# Patient Record
Sex: FEMALE | Race: WHITE | NOT HISPANIC OR LATINO | Employment: FULL TIME | URBAN - NONMETROPOLITAN AREA
[De-identification: names, ages, dates, MRNs, and addresses within clinical notes are randomized per-mention and may not be internally consistent; named-entity substitution may affect disease eponyms.]

---

## 2017-01-09 ENCOUNTER — CLINICAL SUPPORT (OUTPATIENT)
Dept: OTOLARYNGOLOGY | Facility: CLINIC | Age: 52
End: 2017-01-09

## 2017-01-09 DIAGNOSIS — J30.9 ALLERGIC RHINITIS, UNSPECIFIED ALLERGIC RHINITIS TRIGGER, UNSPECIFIED RHINITIS SEASONALITY: Primary | ICD-10-CM

## 2017-01-09 PROCEDURE — 95117 IMMUNOTHERAPY INJECTIONS: CPT | Performed by: OTOLARYNGOLOGY

## 2017-01-09 NOTE — PROGRESS NOTES
ALLERGY SHOT PROCEDURE NOTE     ALLERGY TECHNICIAN:   Raquel Mera RN     ALLERGY HISTORY OF PRESENT ILLNESS:   The patient is a 51 y.o.  who returns for immunotherapy injection.   The patient overall has had an improvement of symptoms since the last injection. The patient has had a 75% improvement of nasal congestion and sinus pressure . That has lasted 7 days. She has not had allergy symptoms recently.    INJECTION DETAILS:   The site of the injection was cleansed with an alcohol swab. Serum was injected into the site after pulling back on the plunger to prevent intravascular injection. The patient showed no signs of immediate reaction. After the injection and was instructed to wait in the allergy waiting area for 20 minutes. After waiting, the patient showed no signs of reaction and was allowed to leave..

## 2017-01-09 NOTE — MR AVS SNAPSHOT
Shannon Redradha   1/9/2017 11:00 AM   Clinical Support    Dept Phone:  [phone]   Encounter #:  [id number]    Provider:  ALLERGY NURSE [facility]   Department:  [facility] OTOLARYNGOLGY                Your Full Care Plan              Your Updated Medication List      Notice  As of 1/9/2017  1:22 PM    You have not been prescribed any medications.            You Were Diagnosed With        Codes Comments    Allergic rhinitis, unspecified allergic rhinitis trigger, unspecified rhinitis seasonality    -  Primary ICD-10-CM: J30.9  ICD-9-CM: 477.9       Medications to be Given to You by a Medical Professional     Due       Frequency    (none) Allergy Serum Injection  Once    (none) Allergy Serum Injection  Once      Instructions     None    Patient Instructions History      Upcoming Appointments     Visit Type Date Time Department    INJECTION 1/9/2017 11:00 AM [facility]      MyChart Signup     Our records indicate that you have declined [facility] signup. If you would like to sign up for [facility], please email [email or web address] or call [phone] to obtain an activation code.             Other Info from Your Visit           Allergies     Shellfish-derived Products        Reason for Visit     Immunotherapy           Problems and Diagnoses Noted     Nasal inflammation due to allergen    -  Primary      Medications Administered     Allergy Serum Injection                  Allergy Serum Injection

## 2017-02-24 ENCOUNTER — CLINICAL SUPPORT NO REQUIREMENTS (OUTPATIENT)
Dept: OTOLARYNGOLOGY | Facility: CLINIC | Age: 52
End: 2017-02-24

## 2017-02-24 DIAGNOSIS — J30.89 OTHER ALLERGIC RHINITIS: Primary | ICD-10-CM

## 2017-02-24 PROCEDURE — 95165 ANTIGEN THERAPY SERVICES: CPT | Performed by: OTOLARYNGOLOGY

## 2017-02-27 ENCOUNTER — CLINICAL SUPPORT (OUTPATIENT)
Dept: OTOLARYNGOLOGY | Facility: CLINIC | Age: 52
End: 2017-02-27

## 2017-02-27 DIAGNOSIS — J30.89 OTHER ALLERGIC RHINITIS: Primary | ICD-10-CM

## 2017-02-27 PROCEDURE — 95117 IMMUNOTHERAPY INJECTIONS: CPT | Performed by: OTOLARYNGOLOGY

## 2017-02-27 NOTE — PROGRESS NOTES
ALLERGY SHOT PROCEDURE NOTE     ALLERGY TECHNICIAN:   Raquel Mera RN     ALLERGY HISTORY OF PRESENT ILLNESS:   The patient is a 51 y.o., who returns for immunotherapy injection.   The patient overall has had an improvement of symptoms since the last injection. The patient has had a 80% improvement of sneezing and Dryness of throat . That has lasted 6 days. She has had a return of sneezing and mouth dryness,dryness of throat since the last injection.     INJECTION DETAILS:   The site of the injection was cleansed with an alcohol swab. Serum was injected into the site after pulling back on the plunger to prevent intravascular injection. The patient showed no signs of immediate reaction. After the injection and was instructed to wait in the allergy waiting area for 20 minutes. After waiting, the patient showed no signs of reaction and was allowed to leave.    Reaction: 3mm

## 2017-04-04 ENCOUNTER — CLINICAL SUPPORT NO REQUIREMENTS (OUTPATIENT)
Dept: OTOLARYNGOLOGY | Facility: CLINIC | Age: 52
End: 2017-04-04

## 2017-04-04 DIAGNOSIS — J30.89 OTHER ALLERGIC RHINITIS: Primary | ICD-10-CM

## 2017-04-04 PROCEDURE — 95165 ANTIGEN THERAPY SERVICES: CPT | Performed by: OTOLARYNGOLOGY

## 2017-04-11 RX ORDER — EPINEPHRINE 0.3 MG/.3ML
INJECTION SUBCUTANEOUS
Qty: 1 EACH | Refills: 0 | Status: SHIPPED | OUTPATIENT
Start: 2017-04-11 | End: 2018-03-22 | Stop reason: SDUPTHER

## 2017-04-24 ENCOUNTER — CLINICAL SUPPORT (OUTPATIENT)
Dept: OTOLARYNGOLOGY | Facility: CLINIC | Age: 52
End: 2017-04-24

## 2017-04-24 DIAGNOSIS — J30.89 ALLERGIC RHINITIS DUE TO OTHER ALLERGIC TRIGGER, UNSPECIFIED RHINITIS SEASONALITY: Primary | ICD-10-CM

## 2017-04-24 PROCEDURE — 95117 IMMUNOTHERAPY INJECTIONS: CPT | Performed by: OTOLARYNGOLOGY

## 2017-04-24 NOTE — PROGRESS NOTES
ALLERGY SHOT PROCEDURE NOTE     ALLERGY TECHNICIAN:   Raquel Mera RN    ALLERGY HISTORY OF PRESENT ILLNESS:   The patient is a 52 y.o., who returns for immunotherapy injection.   The patient overall has had an improvement of symptoms since the last injection. The patient has had a 90% improvement of nasal drainage, nasal congestion and itchy eyes . That has lasted 6 days. She has had a return of sneezing since the last injection.     INJECTION DETAILS:   The site of the injection was cleansed with an alcohol swab. Serum was injected into the site after pulling back on the plunger to prevent intravascular injection. The patient showed no signs of immediate reaction. After the injection and was instructed to wait in the allergy waiting area for 20 minutes. After waiting, the patient showed no signs of reaction and was allowed to leave.    Vial safety test  Reaction: 3mm  Side :     right arm    Maintenance dose    Reaction    4mm  Side:        Left arm

## 2017-06-09 ENCOUNTER — CLINICAL SUPPORT NO REQUIREMENTS (OUTPATIENT)
Dept: OTOLARYNGOLOGY | Facility: CLINIC | Age: 52
End: 2017-06-09

## 2017-06-09 DIAGNOSIS — J30.89 OTHER ALLERGIC RHINITIS: Primary | ICD-10-CM

## 2017-06-09 PROCEDURE — 95165 ANTIGEN THERAPY SERVICES: CPT | Performed by: OTOLARYNGOLOGY

## 2017-06-12 ENCOUNTER — OFFICE VISIT (OUTPATIENT)
Dept: OTOLARYNGOLOGY | Facility: CLINIC | Age: 52
End: 2017-06-12

## 2017-06-12 VITALS
SYSTOLIC BLOOD PRESSURE: 140 MMHG | HEART RATE: 82 BPM | DIASTOLIC BLOOD PRESSURE: 84 MMHG | WEIGHT: 293 LBS | TEMPERATURE: 98 F | HEIGHT: 71 IN | BODY MASS INDEX: 41.02 KG/M2

## 2017-06-12 DIAGNOSIS — J30.9 ALLERGIC RHINITIS, UNSPECIFIED ALLERGIC RHINITIS TRIGGER, UNSPECIFIED RHINITIS SEASONALITY: Primary | ICD-10-CM

## 2017-06-12 PROCEDURE — 99213 OFFICE O/P EST LOW 20 MIN: CPT | Performed by: OTOLARYNGOLOGY

## 2017-06-12 RX ORDER — LIRAGLUTIDE 6 MG/ML
INJECTION SUBCUTANEOUS
Refills: 2 | COMMUNITY
Start: 2017-05-04 | End: 2023-03-28

## 2017-06-12 RX ORDER — LANCETS
EACH MISCELLANEOUS
Refills: 6 | COMMUNITY
Start: 2017-05-30

## 2017-06-12 RX ORDER — PANTOPRAZOLE SODIUM 20 MG/1
TABLET, DELAYED RELEASE ORAL
Refills: 2 | COMMUNITY
Start: 2017-05-08 | End: 2020-06-10

## 2017-06-12 RX ORDER — BUPROPION HYDROCHLORIDE 300 MG/1
TABLET ORAL
Refills: 1 | COMMUNITY
Start: 2017-06-08 | End: 2020-12-21

## 2017-06-12 RX ORDER — BUSPIRONE HYDROCHLORIDE 30 MG/1
TABLET ORAL
Refills: 4 | COMMUNITY
Start: 2017-06-06

## 2017-06-12 RX ORDER — MONTELUKAST SODIUM 10 MG/1
TABLET ORAL
Refills: 3 | COMMUNITY
Start: 2017-05-04 | End: 2019-06-10 | Stop reason: SDUPTHER

## 2017-06-12 RX ORDER — CYANOCOBALAMIN 1000 UG/ML
INJECTION, SOLUTION INTRAMUSCULAR; SUBCUTANEOUS
Refills: 3 | COMMUNITY
Start: 2017-05-15

## 2017-06-12 RX ORDER — FLUTICASONE PROPIONATE 50 MCG
2 SPRAY, SUSPENSION (ML) NASAL DAILY
Qty: 1 BOTTLE | Refills: 6 | Status: SHIPPED | OUTPATIENT
Start: 2017-06-12 | End: 2021-06-14

## 2017-06-12 RX ORDER — THYROID 60 MG
TABLET ORAL
Refills: 0 | COMMUNITY
Start: 2017-03-22 | End: 2020-06-10

## 2017-06-12 RX ORDER — ATORVASTATIN CALCIUM 40 MG/1
TABLET, FILM COATED ORAL
Refills: 2 | COMMUNITY
Start: 2017-04-12 | End: 2019-05-13

## 2017-06-12 RX ORDER — MEDROXYPROGESTERONE ACETATE 2.5 MG/1
TABLET ORAL
Refills: 3 | COMMUNITY
Start: 2017-05-29 | End: 2020-06-10

## 2017-06-12 RX ORDER — ESTRADIOL 1 MG/1
TABLET ORAL
Refills: 2 | COMMUNITY
Start: 2017-05-20 | End: 2020-06-10

## 2017-06-12 RX ORDER — FENOFIBRATE 145 MG/1
TABLET, COATED ORAL
Refills: 2 | COMMUNITY
Start: 2017-05-19 | End: 2020-12-21

## 2017-06-12 RX ORDER — LOSARTAN POTASSIUM 50 MG/1
TABLET ORAL
Refills: 0 | COMMUNITY
Start: 2017-06-07

## 2017-06-12 NOTE — PROGRESS NOTES
Patient Care Team:  Richard Bar MD as PCP - General  Miki Arreguin MD as Consulting Physician (Otolaryngology)    Chief Complaint   Patient presents with   • Follow-up     Allergies       Subjective   History of Present Illness:  Shannon Vega is a  52 y.o.  female who presents for follow up with no acute complaints.     Review of Systems:  Review of Systems   Constitutional: Negative for chills and fever.   HENT:        As per HPI   Gastrointestinal: Negative for nausea and vomiting.       Past History:  Past Medical History:   Diagnosis Date   • Allergic rhinitis    • Arthritis    • Asthma    • Chronic rhinitis    • Chronic sinusitis    • Depression    • Deviated nasal septum    • Diabetes mellitus    • Eustachian tube dysfunction    • GERD (gastroesophageal reflux disease)    • Hypertension    • Hypertrophy of nasal turbinates    • Hypothyroidism      Past Surgical History:   Procedure Laterality Date   • CHOLECYSTECTOMY     • KNEE SURGERY       Family History   Problem Relation Age of Onset   • Heart disease Other      Social History   Substance Use Topics   • Smoking status: Never Smoker   • Smokeless tobacco: Never Used   • Alcohol use No       Current Outpatient Prescriptions:   •  ACCU-CHEK FASTCLIX LANCETS misc, USE TO TEST BLOOD SUGAR 2-3 TIMES A DAY DX: E11.9, Disp: , Rfl: 6  •  ARMOUR THYROID 60 MG tablet, TAKE 1 TABLET BY MOUTH ONCE DAILY *REPLACES LEVOTHYROXINE, Disp: , Rfl: 0  •  atorvastatin (LIPITOR) 40 MG tablet, TAKE 1 TABLET BY MOUTH AT BEDTIME, Disp: , Rfl: 2  •  buPROPion XL (WELLBUTRIN XL) 300 MG 24 hr tablet, TAKE 1 TABLET BY MOUTH DAILY, Disp: , Rfl: 1  •  busPIRone (BUSPAR) 30 MG tablet, TAKE 1 TABLET BY MOUTH TWICE A DAY, Disp: , Rfl: 4  •  cyanocobalamin 1000 MCG/ML injection, INJECT 1 ML EVERY 2 WEEKS, Disp: , Rfl: 3  •  EPINEPHrine (EPIPEN 2-SADIQ) 0.3 MG/0.3ML solution auto-injector injection, Use as directed for Anaphylaxis, Disp: 1 each, Rfl: 0  •  estradiol  (ESTRACE) 1 MG tablet, TAKE 1 TABLET BY MOUTH EVERY DAY, Disp: , Rfl: 2  •  fenofibrate (TRICOR) 145 MG tablet, TAKE 1 TABLET BY MOUTH ONCE A DAY. STOP TRILIPIX., Disp: , Rfl: 2  •  losartan (COZAAR) 50 MG tablet, TAKE 2 TABLETS BY MOUTH EVERY DAY TO REPLACE 100 MG, Disp: , Rfl: 0  •  medroxyPROGESTERone (PROVERA) 2.5 MG tablet, TAKE 2 TABLETS BY MOUTH DAILY FOR THE NEXT 2 WEEKS THEN DECREASE TO 1 TAB DAILY, Disp: , Rfl: 3  •  montelukast (SINGULAIR) 10 MG tablet, TAKE 1 TABLET BY MOUTH DAILY, Disp: , Rfl: 3  •  pantoprazole (PROTONIX) 20 MG EC tablet, TAKE 1 TABLET BY MOUTH EVERY DAY, Disp: , Rfl: 2  •  VICTOZA 18 MG/3ML solution pen-injector, INJECT 1.8 MG SUBCUTANEOUSLY ONCE DAILY, Disp: , Rfl: 2  Allergies:  Shellfish-derived products    Objective     Vital Signs:  Temp:  [98 °F (36.7 °C)] 98 °F (36.7 °C)  Heart Rate:  [82] 82  BP: (140)/(84) 140/84    Physical Exam:  CONSTITUTIONAL: well nourished, well-developed, alert, oriented, in no acute distress   COMMUNICATION AND VOICE: able to communicate normally for age, normal voice quality  HEAD: normocephalic, no lesions, atraumatic, no tenderness, no masses   FACE: appearance normal, no lesions, no tenderness, no deformities, facial motion symmetric  EYES: ocular motility normal, eyelids normal, orbits normal, no proptosis, conjunctiva normal , pupils equal, round   EARS:  Hearing: response to conversational voice normal bilaterally   External Ears: auricles without lesions  NOSE:  External Nose: structure normal, no tenderness on palpation, no nasal discharge, no lesions, no evidence of trauma, nostrils patent   Intranasal exam: nasal mucosa with mucosal congestion and erythema, nasal septum relatively midline   ORAL:  Lips: upper and lower lips without lesion   NECK: neck appearance normal  CHEST/RESPIRATORY: respiratory effort normal, normal chest excursion  CARDIOVASCULAR: extremities without cyanosis or edema   NEUROLOGIC/PSYCHIATRIC: oriented appropriately,  mood normal, affect appropriate, CN II-XII intact grossly      Assessment   1. Allergic rhinitis, unspecified allergic rhinitis trigger, unspecified rhinitis seasonality        Plan   Continue current management plan.  -------MEDICATIONS:-------  continue previous medication as prescribed    Return in about 6 months (around 12/12/2017).    Miki Arreguin MD  06/12/17  10:49 AM

## 2017-06-26 ENCOUNTER — CLINICAL SUPPORT (OUTPATIENT)
Dept: OTOLARYNGOLOGY | Facility: CLINIC | Age: 52
End: 2017-06-26

## 2017-06-26 DIAGNOSIS — J30.9 ALLERGIC RHINITIS, UNSPECIFIED ALLERGIC RHINITIS TRIGGER, UNSPECIFIED RHINITIS SEASONALITY: Primary | ICD-10-CM

## 2017-06-26 PROCEDURE — 95117 IMMUNOTHERAPY INJECTIONS: CPT | Performed by: OTOLARYNGOLOGY

## 2017-06-26 NOTE — PROGRESS NOTES
ALLERGY SHOT PROCEDURE NOTE     ALLERGY TECHNICIAN:   Raquel Mera RN    ALLERGY HISTORY OF PRESENT ILLNESS:   The patient is a 52 y.o., who returns for immunotherapy injection.   The patient overall has had an improvement of symptoms since the last injection. The patient has had a 90% improvement of nasal drainage, nasal congestion and sinus pressure . That has lasted 6 days. She has had a return of nasal drainage and sneezing since the last injection.     INJECTION DETAILS:   The site of the injection was cleansed with an alcohol swab. Serum was injected into the site after pulling back on the plunger to prevent intravascular injection. The patient showed no signs of immediate reaction. After the injection and was instructed to wait in the allergy waiting area for 20 minutes. After waiting, the patient showed no signs of reaction and was allowed to leave.    Reaction: 4mm  Side :     right arm

## 2017-08-25 ENCOUNTER — CLINICAL SUPPORT NO REQUIREMENTS (OUTPATIENT)
Dept: OTOLARYNGOLOGY | Facility: CLINIC | Age: 52
End: 2017-08-25

## 2017-08-25 DIAGNOSIS — J30.89 OTHER ALLERGIC RHINITIS: Primary | ICD-10-CM

## 2017-08-25 PROCEDURE — 95165 ANTIGEN THERAPY SERVICES: CPT | Performed by: OTOLARYNGOLOGY

## 2017-09-11 ENCOUNTER — CLINICAL SUPPORT (OUTPATIENT)
Dept: OTOLARYNGOLOGY | Facility: CLINIC | Age: 52
End: 2017-09-11

## 2017-09-11 DIAGNOSIS — J30.9 ALLERGIC RHINITIS, UNSPECIFIED ALLERGIC RHINITIS TRIGGER, UNSPECIFIED RHINITIS SEASONALITY: Primary | ICD-10-CM

## 2017-09-11 PROCEDURE — 95117 IMMUNOTHERAPY INJECTIONS: CPT | Performed by: OTOLARYNGOLOGY

## 2017-09-11 NOTE — PROGRESS NOTES
ALLERGY SHOT PROCEDURE NOTE     ALLERGY TECHNICIAN:   Raquel Mera RN    ALLERGY HISTORY OF PRESENT ILLNESS:   The patient is a 52 y.o., who returns for immunotherapy injection.   The patient overall has had an improvement of symptoms since the last injection. The patient has had a % improvement of nasal drainage, nasal congestion, sinus pressure and sneezing . That has lasted 6 days. She has had a return of nasal congestion, sinus pressure, sneezing and mouth dryness since the last injection.     INJECTION DETAILS:   The site of the injection was cleansed with an alcohol swab. Serum was injected into the site after pulling back on the plunger to prevent intravascular injection. The patient showed no signs of immediate reaction. After the injection and was instructed to wait in the allergy waiting area for 20 minutes. After waiting, the patient showed no signs of reaction and was allowed to leave.    Vial safety  Reaction: 3mm  Side :     right arm    maintenance dose  Reaction: 3mm  Side :     left arm

## 2017-10-23 ENCOUNTER — CLINICAL SUPPORT NO REQUIREMENTS (OUTPATIENT)
Dept: OTOLARYNGOLOGY | Facility: CLINIC | Age: 52
End: 2017-10-23

## 2017-10-23 DIAGNOSIS — J30.89 OTHER ALLERGIC RHINITIS: Primary | ICD-10-CM

## 2017-10-23 PROCEDURE — 95165 ANTIGEN THERAPY SERVICES: CPT | Performed by: OTOLARYNGOLOGY

## 2017-11-01 ENCOUNTER — TELEPHONE (OUTPATIENT)
Dept: OTOLARYNGOLOGY | Facility: CLINIC | Age: 52
End: 2017-11-01

## 2017-11-01 NOTE — TELEPHONE ENCOUNTER
Left message about allergy vial being available at the [facility] on November 13th or 27th.

## 2017-11-13 ENCOUNTER — CLINICAL SUPPORT (OUTPATIENT)
Dept: OTOLARYNGOLOGY | Facility: CLINIC | Age: 52
End: 2017-11-13

## 2017-11-13 DIAGNOSIS — J30.89 OTHER ALLERGIC RHINITIS: Primary | ICD-10-CM

## 2017-11-13 PROCEDURE — 95115 IMMUNOTHERAPY ONE INJECTION: CPT | Performed by: OTOLARYNGOLOGY

## 2017-11-13 NOTE — PROGRESS NOTES
ALLERGY SHOT PROCEDURE NOTE     ALLERGY TECHNICIAN:   Tyra Smith MA    ALLERGY HISTORY OF PRESENT ILLNESS:   The patient is a 52 y.o., who returns for immunotherapy injection.   The patient overall has had an improvement of symptoms since the last injection. The patient has had a % improvement of nasal drainage, nasal congestion, sinus pressure and sneezing . That has lasted 6 days. She has had a return of nasal congestion, sinus pressure, sneezing and mouth dryness since the last injection.     INJECTION DETAILS:   The site of the injection was cleansed with an alcohol swab. Serum was injected into the site after pulling back on the plunger to prevent intravascular injection. The patient showed no signs of immediate reaction. After the injection and was instructed to wait in the allergy waiting area for 20 minutes. After waiting, the patient showed no signs of reaction and was allowed to leave.    Vial safety  Reaction: 9mm  Side :     right arm    maintenance dose  Reaction: 3mm  Side :     left arm

## 2017-12-11 ENCOUNTER — OFFICE VISIT (OUTPATIENT)
Dept: OTOLARYNGOLOGY | Facility: CLINIC | Age: 52
End: 2017-12-11

## 2017-12-11 VITALS
TEMPERATURE: 97.6 F | WEIGHT: 293 LBS | DIASTOLIC BLOOD PRESSURE: 88 MMHG | SYSTOLIC BLOOD PRESSURE: 138 MMHG | HEIGHT: 71 IN | BODY MASS INDEX: 41.02 KG/M2

## 2017-12-11 DIAGNOSIS — J30.9 CHRONIC ALLERGIC RHINITIS, UNSPECIFIED SEASONALITY, UNSPECIFIED TRIGGER: Primary | ICD-10-CM

## 2017-12-11 PROCEDURE — 99213 OFFICE O/P EST LOW 20 MIN: CPT | Performed by: OTOLARYNGOLOGY

## 2017-12-11 RX ORDER — TESTOSTERONE GEL, 1% 10 MG/G
1 GEL TRANSDERMAL
COMMUNITY
End: 2020-06-10

## 2017-12-11 RX ORDER — MELOXICAM 15 MG/1
15 TABLET ORAL
COMMUNITY
End: 2020-06-10

## 2017-12-11 RX ORDER — FLUTICASONE PROPIONATE 50 MCG
2 SPRAY, SUSPENSION (ML) NASAL
COMMUNITY
End: 2020-12-21 | Stop reason: SDUPTHER

## 2017-12-11 RX ORDER — SOLIFENACIN SUCCINATE 5 MG/1
5 TABLET, FILM COATED ORAL
COMMUNITY

## 2017-12-11 RX ORDER — PSEUDOEPHEDRINE HCL 30 MG
TABLET ORAL
COMMUNITY
Start: 2017-10-23 | End: 2020-12-21

## 2017-12-11 RX ORDER — TRIAMCINOLONE ACETONIDE 5 MG/G
1 CREAM TOPICAL
COMMUNITY

## 2017-12-11 RX ORDER — MECLIZINE HYDROCHLORIDE 25 MG/1
TABLET ORAL
COMMUNITY
Start: 2017-09-05

## 2017-12-11 RX ORDER — FLUOXETINE 10 MG/1
CAPSULE ORAL
COMMUNITY
Start: 2017-09-06 | End: 2023-03-28

## 2017-12-11 RX ORDER — ALBUTEROL SULFATE 90 UG/1
2 AEROSOL, METERED RESPIRATORY (INHALATION)
COMMUNITY

## 2017-12-11 RX ORDER — METFORMIN HYDROCHLORIDE 500 MG/1
TABLET, EXTENDED RELEASE ORAL
COMMUNITY
Start: 2017-09-01 | End: 2023-03-28

## 2017-12-11 RX ORDER — ONDANSETRON HYDROCHLORIDE 8 MG/1
TABLET, FILM COATED ORAL
COMMUNITY
Start: 2017-09-01

## 2017-12-11 RX ORDER — GUAIFENESIN 600 MG/1
600 TABLET, EXTENDED RELEASE ORAL
COMMUNITY

## 2017-12-11 RX ORDER — CETIRIZINE HYDROCHLORIDE 10 MG/1
10 TABLET ORAL
COMMUNITY

## 2017-12-11 NOTE — PROGRESS NOTES
Patient Intake Note    Review of Systems  Review of Systems   Constitutional: Negative for chills, fatigue and fever.   HENT:        See HPI   Respiratory: Negative for cough, choking, shortness of breath and wheezing.    Cardiovascular: Negative.    Gastrointestinal: Negative for constipation, diarrhea, nausea and vomiting.   Allergic/Immunologic: Positive for environmental allergies. Negative for food allergies.   Neurological: Positive for headaches. Negative for dizziness and light-headedness.   Hematological: Does not bruise/bleed easily.   Psychiatric/Behavioral: Negative for sleep disturbance.         Gabbie Gan  12/11/2017  9:19 AM

## 2017-12-11 NOTE — PATIENT INSTRUCTIONS
MyPlate from [employer]  The general, healthful diet is based on the 2010 Dietary Guidelines for Americans. The amount of food you need to eat from each food group depends on your age, sex, and level of physical activity and can be individualized by a dietitian. Go to ChooseMyPlate.gov for more information.  WHAT DO I NEED TO KNOW ABOUT THE MYPLATE PLAN?  · Enjoy your food, but eat less.    · Avoid oversized portions.      ½ of your plate should include fruits and vegetables.    ¼ of your plate should be grains.    ¼ of your plate should be protein.  Grains  · Make at least half of your grains whole grains.  · For a 2,000 calorie daily food plan, eat 6 oz every day.  · 1 oz is about 1 slice bread, 1 cup cereal, or ½ cup cooked rice, cereal, or pasta.  Vegetables  · Make half your plate fruits and vegetables.  · For a 2,000 calorie daily food plan, eat 2½ cups every day.  · 1 cup is about 1 cup raw or cooked vegetables or vegetable juice or 2 cups raw leafy greens.  Fruits  · Make half your plate fruits and vegetables.  · For a 2,000 calorie daily food plan, eat 2 cups every day.  · 1 cup is about 1 cup fruit or 100% fruit juice or ½ cup dried fruit.  Protein  · For a 2,000 calorie daily food plan, eat 5½ oz every day.  · 1 oz is about 1 oz meat, poultry, or fish, ¼ cup cooked beans, 1 egg, 1 Tbsp peanut butter, or ½ oz nuts or seeds.  Dairy  · Switch to fat-free or low-fat (1%) milk.  · For a 2,000 calorie daily food plan, eat 3 cups every day.  · 1 cup is about 1 cup milk or yogurt or soy milk (soy beverage), 1½ oz natural cheese, or 2 oz processed cheese.  Fats, Oils, and Empty Calories  · Only small amounts of oils are recommended.  · Empty calories are calories from solid fats or added sugars.  · Compare sodium in foods like soup, bread, and frozen meals. Choose the foods with lower numbers.  · Drink water instead of sugary drinks.  WHAT FOODS CAN I EAT?  Grains  Whole grains such as whole wheat, quinoa, millet, and  bulgur. Bread, rolls, and pasta made from whole grains. Brown or wild rice. Hot or cold cereals made from whole grains and without added sugar.  Vegetables  All fresh vegetables, especially fresh red, dark green, or orange vegetables. Peas and beans. Low-sodium frozen or canned vegetables prepared without added salt. Low-sodium vegetable juices.  Fruits  All fresh, frozen, and dried fruits. Canned fruit packed in water or fruit juice without added sugar. Fruit juices without added sugar.  Meats and Other Protein Sources  Boiled, baked, or grilled lean meat trimmed of fat. Skinless poultry. Fresh seafood and shellfish. Canned seafood packed in water. Unsalted nuts and unsalted nut butters. Tofu. Dried beans and pea. Eggs.  Dairy  Low-fat or fat-free milk, yogurt, and cheeses.   Sweets and Desserts  Frozen desserts made from low-fat milk.  Fats and Oils  Olive, peanut, and canola oils and margarine. Salad dressing and mayonnaise made from these oils.  Other  Soups and casseroles made from allowed ingredients and without added fat or salt.  The items listed above may not be a complete list of recommended foods or beverages. Contact your dietitian for more options.   WHAT FOODS ARE NOT RECOMMENDED?  Grains  Sweetened, low-fiber cereals. Packaged baked goods. Snack crackers and chips. Cheese crackers, butter crackers, and biscuits. Frozen waffles, sweet breads, doughnuts, pastries, packaged baking mixes, pancakes, cakes, and cookies.  Vegetables  Regular canned or frozen vegetables or vegetables prepared with salt. Canned tomatoes. Canned tomato sauce. Fried vegetables. Vegetables in cream sauce or cheese sauce.  Fruits  Fruits packed in syrup or made with added sugar.   Meats and Other Protein Sources  Marbled or fatty meats such as ribs. Poultry with skin. Fried meats, poultry, eggs, or fish. Sausages, hot dogs, and deli meats such as pastrami, bologna, or salami.  Dairy  Whole milk, cream, cheeses made from whole  milk, sour cream. Ice cream or yogurt made from whole milk or with added sugar.  Beverages  For adults, no more than one alcoholic drink per day. Regular soft drinks or other sugary beverages. Juice drinks.  Sweets and Desserts  Sugary or fatty desserts, candy, and other sweets.  Fats and Oils  Solid shortening or partially hydrogenated oils. Solid margarine. Margarine that contains trans fats. Butter.  The items listed above may not be a complete list of foods and beverages to avoid. Contact your dietitian for more information.     This information is not intended to replace advice given to you by your health care provider. Make sure you discuss any questions you have with your health care provider.     Document Released: 01/06/2009 Document Revised: 01/08/2016 Document Reviewed: 11/26/2014  [employer] Interactive Patient Education ©2017 [employer]   Calorie Counting for Weight Loss  Calories are energy you get from the things you eat and drink. Your body uses this energy to keep you going throughout the day. The number of calories you eat affects your weight. When you eat more calories than your body needs, your body stores the extra calories as fat. When you eat fewer calories than your body needs, your body burns fat to get the energy it needs.  Calorie counting means keeping track of how many calories you eat and drink each day. If you make sure to eat fewer calories than your body needs, you should lose weight. In order for calorie counting to work, you will need to eat the number of calories that are right for you in a day to lose a healthy amount of weight per week. A healthy amount of weight to lose per week is usually 1-2 lb (0.5-0.9 kg). A dietitian can determine how many calories you need in a day and give you suggestions on how to reach your calorie goal.   WHAT IS MY MY PLAN?  My goal is to have __________ calories per day.   If I have this many calories per day, I should lose around __________ pounds  per week.  WHAT DO I NEED TO KNOW ABOUT CALORIE COUNTING?  In order to meet your daily calorie goal, you will need to:  · Find out how many calories are in each food you would like to eat. Try to do this before you eat.  · Decide how much of the food you can eat.  · Write down what you ate and how many calories it had. Doing this is called keeping a food log.  WHERE DO I FIND CALORIE INFORMATION?  The number of calories in a food can be found on a Nutrition Facts label. Note that all the information on a label is based on a specific serving of the food. If a food does not have a Nutrition Facts label, try to look up the calories online or ask your dietitian for help.  HOW DO I DECIDE HOW MUCH TO EAT?  To decide how much of the food you can eat, you will need to consider both the number of calories in one serving and the size of one serving. This information can be found on the Nutrition Facts label. If a food does not have a Nutrition Facts label, look up the information online or ask your dietitian for help.  Remember that calories are listed per serving. If you choose to have more than one serving of a food, you will have to multiply the calories per serving by the amount of servings you plan to eat. For example, the label on a package of bread might say that a serving size is 1 slice and that there are 90 calories in a serving. If you eat 1 slice, you will have eaten 90 calories. If you eat 2 slices, you will have eaten 180 calories.  HOW DO I KEEP A FOOD LOG?  After each meal, record the following information in your food log:  · What you ate.  · How much of it you ate.  · How many calories it had.  · Then, add up your calories.  Keep your food log near you, such as in a small notebook in your pocket. Another option is to use a mobile vernell or website. Some programs will calculate calories for you and show you how many calories you have left each time you add an item to the log.  WHAT ARE SOME CALORIE COUNTING  TIPS?  · Use your calories on foods and drinks that will fill you up and not leave you hungry. Some examples of this include foods like nuts and nut butters, vegetables, lean proteins, and high-fiber foods (more than 5 g fiber per serving).  · Eat nutritious foods and avoid empty calories. Empty calories are calories you get from foods or beverages that do not have many nutrients, such as candy and soda. It is better to have a nutritious high-calorie food (such as an avocado) than a food with few nutrients (such as a bag of chips).  · Know how many calories are in the foods you eat most often. This way, you do not have to look up how many calories they have each time you eat them.  · Look out for foods that may seem like low-calorie foods but are really high-calorie foods, such as baked goods, soda, and fat-free candy.  · Pay attention to calories in drinks. Drinks such as sodas, specialty coffee drinks, alcohol, and juices have a lot of calories yet do not fill you up. Choose low-calorie drinks like water and diet drinks.  · Focus your calorie counting efforts on higher calorie items. Logging the calories in a garden salad that contains only vegetables is less important than calculating the calories in a milk shake.  · Find a way of tracking calories that works for you. Get creative. Most people who are successful find ways to keep track of how much they eat in a day, even if they do not count every calorie.  WHAT ARE SOME PORTION CONTROL TIPS?  · Know how many calories are in a serving. This will help you know how many servings of a certain food you can have.  · Use a measuring cup to measure serving sizes. This is helpful when you start out. With time, you will be able to estimate serving sizes for some foods.  · Take some time to put servings of different foods on your favorite plates, bowls, and cups so you know what a serving looks like.  · Try not to eat straight from a bag or box. Doing this can lead to  "overeating. Put the amount you would like to eat in a cup or on a plate to make sure you are eating the right portion.  · Use smaller plates, glasses, and bowls to prevent overeating. This is a quick and easy way to practice portion control. If your plate is smaller, less food can fit on it.  · Try not to multitask while eating, such as watching TV or using your computer. If it is time to eat, sit down at a table and enjoy your food. Doing this will help you to start recognizing when you are full. It will also make you more aware of what and how much you are eating.  HOW CAN I CALORIE COUNT WHEN EATING OUT?  · Ask for smaller portion sizes or child-sized portions.  · Consider sharing an entree and sides instead of getting your own entree.  · If you get your own entree, eat only half. Ask for a box at the beginning of your meal and put the rest of your entree in it so you are not tempted to eat it.  · Look for the calories on the menu. If calories are listed, choose the lower calorie options.  · Choose dishes that include vegetables, fruits, whole grains, low-fat dairy products, and lean protein. Focusing on smart food choices from each of the 5 food groups can help you stay on track at restaurants.  · Choose items that are boiled, broiled, grilled, or steamed.  · Choose water, milk, unsweetened iced tea, or other drinks without added sugars. If you want an alcoholic beverage, choose a lower calorie option. For example, a regular nyasia can have up to 700 calories and a glass of wine has around 150.  · Stay away from items that are buttered, battered, fried, or served with cream sauce. Items labeled \"crispy\" are usually fried, unless stated otherwise.  · Ask for dressings, sauces, and syrups on the side. These are usually very high in calories, so do not eat much of them.  · Watch out for salads. Many people think salads are a healthy option, but this is often not the case. Many salads come with galindo, fried " chicken, lots of cheese, fried chips, and dressing. All of these items have a lot of calories. If you want a salad, choose a garden salad and ask for grilled meats or steak. Ask for the dressing on the side, or ask for olive oil and vinegar or lemon to use as dressing.  · Estimate how many servings of a food you are given. For example, a serving of cooked rice is ½ cup or about the size of half a tennis ball or one cupcake wrapper. Knowing serving sizes will help you be aware of how much food you are eating at restaurants. The list below tells you how big or small some common portion sizes are based on everyday objects.    1 oz--4 stacked dice.    3 oz--1 deck of cards.    1 tsp--1 dice.    1 Tbsp--½ a Ping-Pong ball.    2 Tbsp--1 Ping-Pong ball.    ½ cup--1 tennis ball or 1 cupcake wrapper.    1 cup--1 baseball.     This information is not intended to replace advice given to you by your health care provider. Make sure you discuss any questions you have with your health care provider.     Document Released: 12/18/2006 Document Revised: 01/08/2016 Document Reviewed: 10/23/2014  [employer] Interactive Patient Education ©2017 [employer]     Exercising to Lose Weight  Exercising can help you to lose weight. In order to lose weight through exercise, you need to do vigorous-intensity exercise. You can tell that you are exercising with vigorous intensity if you are breathing very hard and fast and cannot hold a conversation while exercising.  Moderate-intensity exercise helps to maintain your current weight. You can tell that you are exercising at a moderate level if you have a higher heart rate and faster breathing, but you are still able to hold a conversation.  HOW OFTEN SHOULD I EXERCISE?  Choose an activity that you enjoy and set realistic goals. Your health care provider can help you to make an activity plan that works for you. Exercise regularly as directed by your health care provider. This may include:  · Doing  resistance training twice each week, such as:    Push-ups.    Sit-ups.    Lifting weights.    Using resistance bands.  · Doing a given intensity of exercise for a given amount of time. Choose from these options:    150 minutes of moderate-intensity exercise every week.    75 minutes of vigorous-intensity exercise every week.    A mix of moderate-intensity and vigorous-intensity exercise every week.  Children, pregnant women, people who are out of shape, people who are overweight, and older adults may need to consult a health care provider for individual recommendations. If you have any sort of medical condition, be sure to consult your health care provider before starting a new exercise program.  WHAT ARE SOME ACTIVITIES THAT CAN HELP ME TO LOSE WEIGHT?   · Walking at a rate of at least 4.5 miles an hour.  · Jogging or running at a rate of 5 miles per hour.  · Biking at a rate of at least 10 miles per hour.  · Lap swimming.  · Roller-skating or in-line skating.  · Cross-country skiing.  · Vigorous competitive sports, such as football, basketball, and soccer.  · Jumping rope.  · Aerobic dancing.  HOW CAN I BE MORE ACTIVE IN MY DAY-TO-DAY ACTIVITIES?  · Use the stairs instead of the elevator.  · Take a walk during your lunch break.  · If you drive, park your car farther away from work or school.  · If you take public transportation, get off one stop early and walk the rest of the way.  · Make all of your phone calls while standing up and walking around.  · Get up, stretch, and walk around every 30 minutes throughout the day.  WHAT GUIDELINES SHOULD I FOLLOW WHILE EXERCISING?  · Do not exercise so much that you hurt yourself, feel dizzy, or get very short of breath.  · Consult your health care provider prior to starting a new exercise program.  · Wear comfortable clothes and shoes with good support.  · Drink plenty of water while you exercise to prevent dehydration or heat stroke. Body water is lost during exercise and  must be replaced.  · Work out until you breathe faster and your heart beats faster.     This information is not intended to replace advice given to you by your health care provider. Make sure you discuss any questions you have with your health care provider.     Document Released: 01/20/2012 Document Revised: 01/08/2016 Document Reviewed: 05/21/2015  [employer] Interactive Patient Education ©2017 [employer]

## 2017-12-11 NOTE — PROGRESS NOTES
Patient Care Team:  Richard Bar MD as PCP - General  Miki Arreguin MD as Consulting Physician (Otolaryngology)    Chief Complaint   Patient presents with   • Follow-up     allergy follow up       Subjective   HPI   The patient is a 52-year-old white female who presents for follow-up of allergic rhinitis.  She has been on immunotherapy since 2014. Overall she is doing well with immunotherapy. She still gets sinusitis occasionally but they are fewer on immunotherapy.    Review of Systems:  Reviewed per patient intake note    Past History:  Past Medical History:   Diagnosis Date   • Allergic rhinitis    • Arthritis    • Asthma    • Chronic rhinitis    • Chronic sinusitis    • Depression    • Deviated nasal septum    • Diabetes mellitus    • Eustachian tube dysfunction    • GERD (gastroesophageal reflux disease)    • Hypertension    • Hypertrophy of nasal turbinates    • Hypothyroidism    • Sleep apnea     c-pap dependant     Past Surgical History:   Procedure Laterality Date   • CHOLECYSTECTOMY     • KNEE SURGERY       Family History   Problem Relation Age of Onset   • Heart disease Other      Social History   Substance Use Topics   • Smoking status: Never Smoker   • Smokeless tobacco: Never Used   • Alcohol use No     Current Outpatient Prescriptions on File Prior to Visit   Medication Sig   • ACCU-CHEK FASTCLIX LANCETS misc USE TO TEST BLOOD SUGAR 2-3 TIMES A DAY DX: E11.9   • ARMOUR THYROID 60 MG tablet TAKE 1 TABLET BY MOUTH ONCE DAILY *REPLACES LEVOTHYROXINE   • atorvastatin (LIPITOR) 40 MG tablet TAKE 1 TABLET BY MOUTH AT BEDTIME   • buPROPion XL (WELLBUTRIN XL) 300 MG 24 hr tablet TAKE 1 TABLET BY MOUTH DAILY   • busPIRone (BUSPAR) 30 MG tablet TAKE 1 TABLET BY MOUTH TWICE A DAY   • EPINEPHrine (EPIPEN 2-SADIQ) 0.3 MG/0.3ML solution auto-injector injection Use as directed for Anaphylaxis   • estradiol (ESTRACE) 1 MG tablet TAKE 1 TABLET BY MOUTH EVERY DAY   • fenofibrate (TRICOR) 145 MG tablet  TAKE 1 TABLET BY MOUTH ONCE A DAY. STOP TRILIPIX.   • losartan (COZAAR) 50 MG tablet TAKE 2 TABLETS BY MOUTH EVERY DAY TO REPLACE 100 MG   • medroxyPROGESTERone (PROVERA) 2.5 MG tablet TAKE 2 TABLETS BY MOUTH DAILY FOR THE NEXT 2 WEEKS THEN DECREASE TO 1 TAB DAILY   • montelukast (SINGULAIR) 10 MG tablet TAKE 1 TABLET BY MOUTH DAILY   • pantoprazole (PROTONIX) 20 MG EC tablet TAKE 1 TABLET BY MOUTH EVERY DAY   • VICTOZA 18 MG/3ML solution pen-injector INJECT 1.8 MG SUBCUTANEOUSLY ONCE DAILY   • cyanocobalamin 1000 MCG/ML injection INJECT 1 ML EVERY 2 WEEKS     No current facility-administered medications on file prior to visit.      Allergies:  Shellfish-derived products    Objective      Vital Signs:   Temp:  [97.6 °F (36.4 °C)] 97.6 °F (36.4 °C)  BP: (138)/(88) 138/88    Physical Exam   CONSTITUTIONAL: well nourished, well-developed, alert, oriented, in no acute distress   COMMUNICATION AND VOICE: able to communicate normally, normal voice quality  HEAD: normocephalic, no lesions, atraumatic, no tenderness, no masses   FACE: appearance normal, no lesions, no tenderness, no deformities, facial motion symmetric  EYES: ocular motility normal, eyelids normal, orbits normal, no proptosis, conjunctiva normal , pupils equal, round  HEARING: response to conversational voice normal bilaterally   EXTERNAL EARS: auricles without lesions  EXTERNAL NOSE: structure normal, no tenderness on palpation, no nasal discharge, no lesions, no evidence of trauma, nostrils patent  INTRANASAL EXAM: nasal mucosa with mucosal congestion and erythema, nasal septum without overt anterior deviation  LIPS: structure normal, no tenderness on palpation, no lesions, no evidence of trauma  NECK: neck appearance normal  LYMPH NODES: no lymphadenopathy  CHEST/RESPIRATORY: respiratory effort normal  CARDIOVASCULAR: extremities without cyanosis or edema, no overt jugulovenous distension present  NEUROLOGIC/PSYCHIATRIC: oriented appropriately for age,  mood normal, affect appropriate, cranial nerves intact grossly unless specifically mentioned above         Assessment   1. Chronic allergic rhinitis, unspecified seasonality, unspecified trigger        Plan    Continue current management plan.    QUALITY MEASURES   Body Mass Index Screening and Follow-Up Plan   Body mass index is 41.49 kg/(m^2).  Diet and generalized activity/ exersize handouts given on AVS.    Tobacco Use: Screening and Cessation Intervention     Smoking status: Never Smoker                                                              Smokeless status: Never Used                            Return in about 1 year (around 12/11/2018).    Miki Arreguin MD  12/11/17  9:35 AM

## 2017-12-29 ENCOUNTER — CLINICAL SUPPORT NO REQUIREMENTS (OUTPATIENT)
Dept: OTOLARYNGOLOGY | Facility: CLINIC | Age: 52
End: 2017-12-29

## 2017-12-29 DIAGNOSIS — J30.89 OTHER ALLERGIC RHINITIS: Primary | ICD-10-CM

## 2017-12-29 PROCEDURE — 95165 ANTIGEN THERAPY SERVICES: CPT | Performed by: OTOLARYNGOLOGY

## 2018-01-08 ENCOUNTER — CLINICAL SUPPORT (OUTPATIENT)
Dept: OTOLARYNGOLOGY | Facility: CLINIC | Age: 53
End: 2018-01-08

## 2018-01-08 DIAGNOSIS — J30.9 CHRONIC ALLERGIC RHINITIS, UNSPECIFIED SEASONALITY, UNSPECIFIED TRIGGER: Primary | ICD-10-CM

## 2018-01-08 PROCEDURE — 95117 IMMUNOTHERAPY INJECTIONS: CPT | Performed by: OTOLARYNGOLOGY

## 2018-01-08 NOTE — PROGRESS NOTES
ALLERGY SHOT PROCEDURE NOTE     ALLERGY TECHNICIAN:   Raquel Mera RN    ALLERGY HISTORY OF PRESENT ILLNESS:   The patient is a 52 y.o., who returns for immunotherapy injection.   The patient overall has had an improvement of symptoms since the last injection. The patient has had a 40% improvement of nasal drainage and nasal congestion . That has lasted 6 days. She has had a return of nasal drainage, nasal congestion, sneezing, itchy nose and mouth dryness since the last injection.     INJECTION DETAILS:   The site of the injection was cleansed with an alcohol swab. Serum was injected into the site after pulling back on the plunger to prevent intravascular injection. The patient showed no signs of immediate reaction. After the injection and was instructed to wait in the allergy waiting area for 20 minutes. After waiting, the patient showed no signs of reaction and was allowed to leave.    Vial safety  Reaction 5mm  Right arm    Maintenance  Reaction: 4mm  Side :     left arm

## 2018-03-12 ENCOUNTER — CLINICAL SUPPORT NO REQUIREMENTS (OUTPATIENT)
Dept: OTOLARYNGOLOGY | Facility: CLINIC | Age: 53
End: 2018-03-12

## 2018-03-12 ENCOUNTER — TELEPHONE (OUTPATIENT)
Dept: OTOLARYNGOLOGY | Facility: CLINIC | Age: 53
End: 2018-03-12

## 2018-03-12 DIAGNOSIS — J30.89 OTHER ALLERGIC RHINITIS: Primary | ICD-10-CM

## 2018-03-12 PROCEDURE — 95165 ANTIGEN THERAPY SERVICES: CPT | Performed by: OTOLARYNGOLOGY

## 2018-03-12 NOTE — TELEPHONE ENCOUNTER
Left message about allergy serum refill vial being ready and would be available at the [facility] on 3-.

## 2018-03-22 ENCOUNTER — CLINICAL SUPPORT (OUTPATIENT)
Dept: OTOLARYNGOLOGY | Facility: CLINIC | Age: 53
End: 2018-03-22

## 2018-03-22 DIAGNOSIS — J30.9 CHRONIC ALLERGIC RHINITIS, UNSPECIFIED SEASONALITY, UNSPECIFIED TRIGGER: Primary | ICD-10-CM

## 2018-03-22 PROCEDURE — 95117 IMMUNOTHERAPY INJECTIONS: CPT | Performed by: OTOLARYNGOLOGY

## 2018-03-22 RX ORDER — EPINEPHRINE 0.3 MG/.3ML
INJECTION SUBCUTANEOUS
Qty: 1 EACH | Refills: 0 | Status: SHIPPED | OUTPATIENT
Start: 2018-03-22 | End: 2018-07-31 | Stop reason: SDUPTHER

## 2018-03-22 NOTE — PROGRESS NOTES
ALLERGY SHOT PROCEDURE NOTE      ALLERGY TECHNICIAN:   Raquel Mera RN     ALLERGY HISTORY OF PRESENT ILLNESS:   The patient is a 52 y.o., who returns for immunotherapy injection.   The patient overall has had an improvement of symptoms since the last injection. The patient has had a 40% improvement of nasal drainage and nasal congestion . That has lasted 6 days. She has had a return of nasal drainage, nasal congestion, sneezing, itchy nose and mouth dryness since the last injection.      INJECTION DETAILS:   The site of the injection was cleansed with an alcohol swab. Serum was injected into the site after pulling back on the plunger to prevent intravascular injection. The patient showed no signs of immediate reaction. After the injection and was instructed to wait in the allergy waiting area for 20 minutes. After waiting, the patient showed no signs of reaction and was allowed to leave.     Vial safety  Reaction 4mm  Left arm     Maintenance  Reaction: 8mm  Side :       Right arm

## 2018-05-11 ENCOUNTER — CLINICAL SUPPORT NO REQUIREMENTS (OUTPATIENT)
Dept: OTOLARYNGOLOGY | Facility: CLINIC | Age: 53
End: 2018-05-11

## 2018-05-11 DIAGNOSIS — J30.89 OTHER ALLERGIC RHINITIS: Primary | ICD-10-CM

## 2018-05-11 PROCEDURE — 95165 ANTIGEN THERAPY SERVICES: CPT | Performed by: OTOLARYNGOLOGY

## 2018-06-05 ENCOUNTER — CLINICAL SUPPORT (OUTPATIENT)
Dept: OTOLARYNGOLOGY | Facility: CLINIC | Age: 53
End: 2018-06-05

## 2018-06-05 DIAGNOSIS — J30.89 OTHER ALLERGIC RHINITIS: Primary | ICD-10-CM

## 2018-06-05 PROCEDURE — 95117 IMMUNOTHERAPY INJECTIONS: CPT | Performed by: PHYSICIAN ASSISTANT

## 2018-06-05 NOTE — PROGRESS NOTES
ALLERGY SHOT PROCEDURE NOTE      ALLERGY TECHNICIAN:   Alon SCHUMACHER     ALLERGY HISTORY OF PRESENT ILLNESS:   The patient is a 52 y.o., who returns for immunotherapy injection.   The patient overall has had an improvement of symptoms since the last injection. The patient has had a 40% improvement of nasal drainage and nasal congestion . That has lasted 6 days. She has had a return of nasal drainage, nasal congestion, sneezing, itchy nose and mouth dryness since the last injection.      INJECTION DETAILS:   The site of the injection was cleansed with an alcohol swab. Serum was injected into the site after pulling back on the plunger to prevent intravascular injection. The patient showed no signs of immediate reaction. After the injection and was instructed to wait in the allergy waiting area for 20 minutes. After waiting, the patient showed no signs of reaction and was allowed to leave.     Combined Maintenance Series Serum  Left Arm @ .05cc for vial safety testing with a 9mm wheal.  Right Arm @ .45cc to equal Maintenance Series Dose.

## 2018-07-12 ENCOUNTER — CLINICAL SUPPORT NO REQUIREMENTS (OUTPATIENT)
Dept: OTOLARYNGOLOGY | Facility: CLINIC | Age: 53
End: 2018-07-12

## 2018-07-12 DIAGNOSIS — J30.89 OTHER ALLERGIC RHINITIS: Primary | ICD-10-CM

## 2018-07-12 PROCEDURE — 95165 ANTIGEN THERAPY SERVICES: CPT | Performed by: OTOLARYNGOLOGY

## 2018-07-26 ENCOUNTER — CLINICAL SUPPORT (OUTPATIENT)
Dept: OTOLARYNGOLOGY | Facility: CLINIC | Age: 53
End: 2018-07-26

## 2018-07-26 DIAGNOSIS — J30.9 CHRONIC ALLERGIC RHINITIS, UNSPECIFIED SEASONALITY, UNSPECIFIED TRIGGER: Primary | ICD-10-CM

## 2018-07-26 PROCEDURE — 95117 IMMUNOTHERAPY INJECTIONS: CPT | Performed by: PHYSICIAN ASSISTANT

## 2018-07-26 NOTE — PROGRESS NOTES
ALLERGY SHOT PROCEDURE NOTE     ALLERGY TECHNICIAN:   Raquel Mera RN    ALLERGY HISTORY OF PRESENT ILLNESS:   The patient is a 53 y.o., who returns for immunotherapy injection.   The patient overall has had an improvement of symptoms since the last injection. The patient has had a 80% improvement of sneezing, facial pressure and headaches . That has lasted 5 days. She has had a return of facial pressure and headaches since the last injection.     INJECTION DETAILS:   The site of the injection was cleansed with an alcohol swab. Serum was injected into the site after pulling back on the plunger to prevent intravascular injection. The patient showed no signs of immediate reaction. After the injection and was instructed to wait in the allergy waiting area for 20 minutes. After waiting, the patient showed no signs of reaction and was allowed to leave.    Vial safety test  Reaction: 4mm  Side :     left arm    Maintenance dose  Reaction: 3mm  Side :     right arm

## 2018-07-31 DIAGNOSIS — J30.9 CHRONIC ALLERGIC RHINITIS, UNSPECIFIED SEASONALITY, UNSPECIFIED TRIGGER: ICD-10-CM

## 2018-07-31 RX ORDER — EPINEPHRINE 0.3 MG/.3ML
INJECTION SUBCUTANEOUS
Qty: 1 EACH | Refills: 0 | Status: SHIPPED | OUTPATIENT
Start: 2018-07-31 | End: 2019-11-18 | Stop reason: SDUPTHER

## 2018-09-14 ENCOUNTER — CLINICAL SUPPORT NO REQUIREMENTS (OUTPATIENT)
Dept: OTOLARYNGOLOGY | Facility: CLINIC | Age: 53
End: 2018-09-14

## 2018-09-14 DIAGNOSIS — J30.89 OTHER ALLERGIC RHINITIS: Primary | ICD-10-CM

## 2018-09-14 PROCEDURE — 95165 ANTIGEN THERAPY SERVICES: CPT | Performed by: OTOLARYNGOLOGY

## 2018-09-27 ENCOUNTER — CLINICAL SUPPORT (OUTPATIENT)
Dept: OTOLARYNGOLOGY | Facility: CLINIC | Age: 53
End: 2018-09-27

## 2018-09-27 DIAGNOSIS — J30.9 CHRONIC ALLERGIC RHINITIS, UNSPECIFIED SEASONALITY, UNSPECIFIED TRIGGER: Primary | ICD-10-CM

## 2018-09-27 PROCEDURE — 95117 IMMUNOTHERAPY INJECTIONS: CPT | Performed by: OTOLARYNGOLOGY

## 2018-09-27 NOTE — PROGRESS NOTES
ALLERGY SHOT PROCEDURE NOTE     ALLERGY TECHNICIAN:   Raquel Mera RN    ALLERGY HISTORY OF PRESENT ILLNESS:   The patient is a 53 y.o., who returns for immunotherapy injection.   The patient overall has had an improvement of symptoms since the last injection. The patient has had a 80% improvement of sinonasal complaints . That has lasted 5 days. She has had a return of sinonasal complaints and itchy nose since the last injection.     INJECTION DETAILS:   The site of the injection was cleansed with an alcohol swab. Serum was injected into the site after pulling back on the plunger to prevent intravascular injection. The patient showed no signs of immediate reaction. After the injection and was instructed to wait in the allergy waiting area for 20 minutes. After waiting, the patient showed no signs of reaction and was allowed to leave.    Vial safety test  Reaction: 4mm  Side :     left arm    Maintenance injection  Reaction: 4mm  Side :     right arm    Epi pens checked for condition and expiration date.

## 2018-11-26 ENCOUNTER — CLINICAL SUPPORT NO REQUIREMENTS (OUTPATIENT)
Dept: OTOLARYNGOLOGY | Facility: CLINIC | Age: 53
End: 2018-11-26

## 2018-11-26 DIAGNOSIS — J30.89 OTHER ALLERGIC RHINITIS: Primary | ICD-10-CM

## 2018-11-26 PROCEDURE — 95165 ANTIGEN THERAPY SERVICES: CPT | Performed by: OTOLARYNGOLOGY

## 2018-11-27 ENCOUNTER — CLINICAL SUPPORT (OUTPATIENT)
Dept: OTOLARYNGOLOGY | Facility: CLINIC | Age: 53
End: 2018-11-27

## 2018-11-27 DIAGNOSIS — J30.89 OTHER ALLERGIC RHINITIS: Primary | ICD-10-CM

## 2018-11-27 PROCEDURE — 95117 IMMUNOTHERAPY INJECTIONS: CPT | Performed by: NURSE PRACTITIONER

## 2018-11-27 NOTE — PROGRESS NOTES
ALLERGY SHOT PROCEDURE NOTE      ALLERGY TECHNICIAN:   Alon SCHUMACHER     ALLERGY HISTORY OF PRESENT ILLNESS:   The patient is a 52 y.o., who returns for immunotherapy injection.   The patient overall has had an improvement of symptoms since the last injection. The patient has had a 40% improvement of nasal drainage and nasal congestion . That has lasted 6 days. She has had a return of nasal drainage, nasal congestion, sneezing, itchy nose and mouth dryness since the last injection.      INJECTION DETAILS:   The site of the injection was cleansed with an alcohol swab. Serum was injected into the site after pulling back on the plunger to prevent intravascular injection. The patient showed no signs of immediate reaction. After the injection and was instructed to wait in the allergy waiting area for 20 minutes. After waiting, the patient showed no signs of reaction and was allowed to leave.     Combined Maintenance Series Serum  Left Arm @ .05cc for vial safety testing with a 9mm wheal.  Right Arm @ .45cc to equal Maintenance Series Dose.    *EpiPen Checked  *Expires 11/2019

## 2018-12-13 ENCOUNTER — OFFICE VISIT (OUTPATIENT)
Dept: OTOLARYNGOLOGY | Facility: CLINIC | Age: 53
End: 2018-12-13

## 2018-12-13 VITALS
HEIGHT: 71 IN | DIASTOLIC BLOOD PRESSURE: 86 MMHG | WEIGHT: 293 LBS | SYSTOLIC BLOOD PRESSURE: 138 MMHG | RESPIRATION RATE: 16 BRPM | BODY MASS INDEX: 41.02 KG/M2

## 2018-12-13 DIAGNOSIS — J30.89 OTHER ALLERGIC RHINITIS: Primary | ICD-10-CM

## 2018-12-13 PROCEDURE — 99213 OFFICE O/P EST LOW 20 MIN: CPT | Performed by: OTOLARYNGOLOGY

## 2018-12-13 RX ORDER — METHYLPREDNISOLONE 4 MG/1
TABLET ORAL
Qty: 1 EACH | Refills: 0 | Status: SHIPPED | OUTPATIENT
Start: 2018-12-13 | End: 2018-12-13

## 2018-12-13 RX ORDER — CEFDINIR 300 MG/1
300 CAPSULE ORAL 2 TIMES DAILY
Qty: 20 CAPSULE | Refills: 0 | Status: SHIPPED | OUTPATIENT
Start: 2018-12-13 | End: 2018-12-13

## 2018-12-13 NOTE — PROGRESS NOTES
Miki Arreguin MD     Chief Complaint   Patient presents with   • Allergies       HPI   Shannon Vega is a  53 y.o. female who is here for follow up. She has had no current complaints. The patient has not had complaints of: allergy flair ups.    Review of Systems:  Reviewed per patient intake note    Past History:  Past medical and surgical history, family history and social history reviewed and updated when appropriate.  Current medications and allergies reviewed and updated when appropriate.  Allergies:  Shellfish-derived products    Vital Signs:   Resp:  [16] 16  BP: (138)/(86) 138/86    Physical Exam   CONSTITUTIONAL: well nourished, well-developed, alert, oriented, in no acute distress   COMMUNICATION AND VOICE: able to communicate normally, normal voice quality  HEAD: normocephalic, no lesions, atraumatic, no tenderness, no masses   FACE: appearance normal, no lesions, no tenderness, no deformities, facial motion symmetric  EYES: ocular motility normal, eyelids normal, orbits normal, no proptosis, conjunctiva normal , pupils equal, round  HEARING: response to conversational voice normal bilaterally   EXTERNAL EARS: auricles without lesions  EXTERNAL NOSE: structure normal, no tenderness on palpation, no nasal discharge, no lesions, no evidence of trauma, nostrils patent  INTRANASAL EXAM: nasal mucosa with mucosal congestion and erythema, nasal septum without overt anterior deviation  LIPS: structure normal, no tenderness on palpation, no lesions, no evidence of trauma  NECK: neck appearance normal  LYMPH NODES: no lymphadenopathy  CHEST/RESPIRATORY: respiratory effort normal  CARDIOVASCULAR: extremities without cyanosis or edema, no overt jugulovenous distension present  NEUROLOGIC/PSYCHIATRIC: oriented appropriately for age, mood normal, affect appropriate, cranial nerves intact grossly unless specifically mentioned above         Assessment   1. Other allergic rhinitis        Plan    Continue current  management plan.    Return in about 6 months (around 6/13/2019).    Miki Arreguin MD  12/13/18  9:57 AM

## 2018-12-13 NOTE — PROGRESS NOTES
Procedure   Right Myringotomy Tube Insertion  Date/Time: 12/13/2018 9:29 AM  Performed by: Miki Arreguin MD  Authorized by: Miki Arreguin MD   Local anesthesia used: yes  Anesthesia method: topical phenol.    Anesthesia:  Local anesthesia used: yes  Comments: SIDE: right    ESTIMATED BLOOD LOSS:  Negligible.    DRAINS: None.    SPECIMEN:  None.    COMPLICATIONS:  None.    FINDINGS:  There was a clear ear space but inflammation in the eardrum and the middle ear space mucosa.    PROCEDURE DESCRIPTION:    The patient was taken back to the treatment room and placed supine on the table. After this was done the operating microscope was placed into view. Using the speculum and curette, the external auditory canal was cleaned of its cerumen and this exposed the tympanic membrane. A myringotomy was created in a radial fashion. After suctioning, an Joe modified beveled tube was placed in the myringotomy. Medicated drops placed: Otovel There were no complications during the case.

## 2018-12-13 NOTE — PROGRESS NOTES
Raquel Mera RN   Patient Intake Note    Review of Systems  Review of Systems   Constitutional: Negative for chills and fever.   HENT:        See HPI   Eyes: Positive for itching.   Respiratory: Negative for cough, choking and shortness of breath.    Gastrointestinal: Negative for diarrhea, nausea and vomiting.   Musculoskeletal: Negative for neck pain and neck stiffness.   Allergic/Immunologic: Positive for environmental allergies.   Neurological: Negative for dizziness and light-headedness.   Hematological: Does not bruise/bleed easily.   Psychiatric/Behavioral: Negative for sleep disturbance.   All other systems reviewed and are negative.      QUALITY MEASURES    Body Mass Index Screening and Follow-Up Plan  Body mass index is 42.96 kg/m².  Patient's Body mass index is 42.96 kg/m². BMI is above normal parameters. Recommendations include: referral to primary care.    Tobacco Use: Screening and Cessation Intervention  Social History    Tobacco Use      Smoking status: Never Smoker      Smokeless tobacco: Never Used        Raquel Mera RN  12/13/2018  9:13 AM

## 2018-12-13 NOTE — PROGRESS NOTES
Miki Arreguin MD     Chief Complaint   Patient presents with   • Allergies       HPI   Shannon Vega is a  53 y.o. female who is here for follow up. The patient is a 52-year-old white female who presents for follow-up of allergic rhinitis.  She has been on immunotherapy since 2014.  She has had a recent upper respiratory infection and has an inability to clear the pressure on her right ear.  She has hearing loss and fullness in the ear with a tenderness.  These are moderate in nature.  They are persisting the last several weeks.    Review of Systems:  Reviewed per patient intake note    Past History:  Past medical and surgical history, family history and social history reviewed and updated when appropriate.  Current medications and allergies reviewed and updated when appropriate.  Allergies:  Shellfish-derived products    Vital Signs:   Resp:  [16] 16  BP: (138)/(86) 138/86    Physical Exam   CONSTITUTIONAL: well nourished, well-developed, alert, oriented, in no acute distress   COMMUNICATION AND VOICE: able to communicate normally, normal voice quality  HEAD: normocephalic, no lesions, atraumatic, no tenderness, no masses   FACE: appearance normal, no lesions, no tenderness, no deformities, facial motion symmetric  EYES: ocular motility normal, eyelids normal, orbits normal, no proptosis, conjunctiva normal , pupils equal, round  HEARING: response to conversational voice normal bilaterally   EXTERNAL EARS: auricles without lesions  RIGHT EXTERNAL EAR CANAL: There is no extruded tube up against the eardrum.  LEFT EXTERNAL EAR CANAL: normal ear canals without stenosis or significant cerumen  TYMPANIC MEMBRANE: The left eardrum is clear with mild myringosclerosis.  The right side has dullness and a opacified eardrum.  EXTERNAL NOSE: structure normal, no tenderness on palpation, no nasal discharge, no lesions, no evidence of trauma, nostrils patent  INTRANASAL EXAM: nasal mucosa with mucosal congestion and  erythema, nasal septum without overt anterior deviation  LIPS: structure normal, no tenderness on palpation, no lesions, no evidence of trauma  NECK: neck appearance normal  LYMPH NODES: no lymphadenopathy  CHEST/RESPIRATORY: respiratory effort normal  CARDIOVASCULAR: extremities without cyanosis or edema, no overt jugulovenous distension present  NEUROLOGIC/PSYCHIATRIC: oriented appropriately for age, mood normal, affect appropriate, cranial nerves intact grossly unless specifically mentioned above          Assessment   1. Other allergic rhinitis    2. Eustachian tube dysfunction, bilateral    3. Tinnitus of right ear    4. Right ear pain        Plan    MYRINGOTOMY TUBE INSERTION: The risks and benefits of myringotomy tube insertion were explained including but not limited to pain, aural fullness, bleeding, infection, risks of the anesthesia, persistent tympanic membrane perforation, chronic otorrhea, early and late extrusion, and the possibility for the need of reinsertion after extrusion. Alternatives were discussed. The patient/parents demonstrated understanding of these risks. Questions were asked appropriately answered.        New Medications Ordered This Visit   Medications   • MethylPREDNISolone (MEDROL) 4 MG tablet     Sig: Take as directed on package instructions.     Dispense:  1 each     Refill:  0   • cefdinir (OMNICEF) 300 MG capsule     Sig: Take 1 capsule by mouth 2 (Two) Times a Day for 10 days.     Dispense:  20 capsule     Refill:  0         Continue use the Ciprodex drops for up to 1 week.    Return in about 2 months (around 2/13/2019).    Miki Arreguin MD  12/13/18  9:30 AM

## 2019-01-11 ENCOUNTER — CLINICAL SUPPORT NO REQUIREMENTS (OUTPATIENT)
Dept: OTOLARYNGOLOGY | Facility: CLINIC | Age: 54
End: 2019-01-11

## 2019-01-11 DIAGNOSIS — J30.89 OTHER ALLERGIC RHINITIS: Primary | ICD-10-CM

## 2019-01-11 PROCEDURE — 95165 ANTIGEN THERAPY SERVICES: CPT | Performed by: OTOLARYNGOLOGY

## 2019-01-24 ENCOUNTER — CLINICAL SUPPORT (OUTPATIENT)
Dept: OTOLARYNGOLOGY | Facility: CLINIC | Age: 54
End: 2019-01-24

## 2019-01-24 DIAGNOSIS — J30.9 ALLERGIC RHINITIS, UNSPECIFIED SEASONALITY, UNSPECIFIED TRIGGER: Primary | ICD-10-CM

## 2019-01-24 PROCEDURE — 95117 IMMUNOTHERAPY INJECTIONS: CPT | Performed by: OTOLARYNGOLOGY

## 2019-01-24 NOTE — PROGRESS NOTES
ALLERGY SHOT PROCEDURE NOTE     ALLERGY TECHNICIAN:   Raquel Mera RN    ALLERGY HISTORY OF PRESENT ILLNESS:   The patient is a 53 y.o., who returns for immunotherapy injection.   The patient overall has had an improvement of symptoms since the last injection. The patient has had a 50% improvement of sinonasal complaints, sinusitis, sinus pressure, itchy nose and itchy eyes . That has lasted 5 days. She has had a return of sneezing, itchy eyes and facial pressure since the last injection.     INJECTION DETAILS:   Epi pen was presented to me by the patient and checked for expiration date and was found to be in working order. The site of the injection was cleansed with an alcohol swab. Serum was injected into the site after pulling back on the plunger to prevent intravascular injection. The patient showed no signs of immediate reaction. After the injection and was instructed to wait in the allergy waiting area for 30 minutes. After waiting, the patient showed no signs of reaction and was allowed to leave.    Vial safety test  Reaction: 4mm  Side :     right arm    Remainder injection  Reaction: 5mm  Side Left arm    Epi pen checked, in working order. Expiration 11/2019 Lot number [id number]

## 2019-03-01 ENCOUNTER — CLINICAL SUPPORT NO REQUIREMENTS (OUTPATIENT)
Dept: OTOLARYNGOLOGY | Facility: CLINIC | Age: 54
End: 2019-03-01

## 2019-03-01 DIAGNOSIS — J30.89 OTHER ALLERGIC RHINITIS: Primary | ICD-10-CM

## 2019-03-01 PROCEDURE — 95165 ANTIGEN THERAPY SERVICES: CPT | Performed by: OTOLARYNGOLOGY

## 2019-03-04 ENCOUNTER — TELEPHONE (OUTPATIENT)
Dept: OTOLARYNGOLOGY | Facility: CLINIC | Age: 54
End: 2019-03-04

## 2019-03-04 NOTE — TELEPHONE ENCOUNTER
Left message about refill of allergy serum being available. Reminded the patient that it would be at the [facility] on March 11th or the 25th and to call for appointment.

## 2019-03-25 ENCOUNTER — CLINICAL SUPPORT (OUTPATIENT)
Dept: OTOLARYNGOLOGY | Facility: CLINIC | Age: 54
End: 2019-03-25

## 2019-03-25 DIAGNOSIS — J30.9 ALLERGIC RHINITIS, UNSPECIFIED SEASONALITY, UNSPECIFIED TRIGGER: Primary | ICD-10-CM

## 2019-03-25 PROCEDURE — 95117 IMMUNOTHERAPY INJECTIONS: CPT | Performed by: OTOLARYNGOLOGY

## 2019-03-25 NOTE — PROGRESS NOTES
I have reviewed the allergy testing/ mixing and or treatment notes, and procedures, I concur with her/his documentation of Shannon Vega.      Miki Arreguin MD  03/25/19  12:33 PM

## 2019-03-25 NOTE — PROGRESS NOTES
ALLERGY SHOT PROCEDURE NOTE     ALLERGY TECHNICIAN:   Raquel Mera RN    ALLERGY HISTORY OF PRESENT ILLNESS:   The patient is a 54 y.o., who returns for immunotherapy injection.   The patient overall has had an improvement of symptoms since the last injection. The patient has had a 60% improvement of sinonasal complaints . That has lasted 6 days. She denies allergy complaints currently.    INJECTION DETAILS:   Epi pen was presented to me by the patient and checked for expiration date and was found to be in working order. The site of the injection was cleansed with an alcohol swab. Serum was injected into the site after pulling back on the plunger to prevent intravascular injection. The patient showed no signs of immediate reaction. After the injection and was instructed to wait in the allergy waiting area for 30 minutes. After waiting, the patient showed no signs of reaction and was allowed to leave.    Vial safety test dose  Reaction 3mm  Side: Right arm    Remainder dose  Reaction 5mm  Side: Left arm

## 2019-04-04 ENCOUNTER — CLINICAL SUPPORT (OUTPATIENT)
Dept: OTOLARYNGOLOGY | Facility: CLINIC | Age: 54
End: 2019-04-04

## 2019-04-04 DIAGNOSIS — J30.89 OTHER ALLERGIC RHINITIS: Primary | ICD-10-CM

## 2019-04-04 PROCEDURE — 95115 IMMUNOTHERAPY ONE INJECTION: CPT | Performed by: PHYSICIAN ASSISTANT

## 2019-04-04 NOTE — PROGRESS NOTES
I have reviewed the notes, assessments, and/or procedures performed by Alon Boothe, I concur with her/his documentation of Shannon Vega.

## 2019-04-04 NOTE — PROGRESS NOTES
ALLERGY SHOT PROCEDURE NOTE     ALLERGY TECHNICIAN:   Alon GO/AT    ALLERGY HISTORY OF PRESENT ILLNESS:   The patient is a 54 y.o., who returns for immunotherapy injection.   The patient overall has had an improvement of symptoms since the last injection. The patient has had a 60% improvement of sinonasal complaints . That has lasted 6 days. She denies allergy complaints currently.    INJECTION DETAILS:   Epi pen was presented to me by the patient and checked for expiration date and was found to be in working order. The site of the injection was cleansed with an alcohol swab. Serum was injected into the site after pulling back on the plunger to prevent intravascular injection. The patient showed no signs of immediate reaction. After the injection and was instructed to wait in the allergy waiting area for 30 minutes. After waiting, the patient showed no signs of reaction and was allowed to leave.    Combined Maintenance Series Serum  Right Arm@ .50cc

## 2019-05-10 ENCOUNTER — CLINICAL SUPPORT NO REQUIREMENTS (OUTPATIENT)
Dept: OTOLARYNGOLOGY | Facility: CLINIC | Age: 54
End: 2019-05-10

## 2019-05-10 DIAGNOSIS — J30.89 OTHER ALLERGIC RHINITIS: Primary | ICD-10-CM

## 2019-05-10 PROCEDURE — 95165 ANTIGEN THERAPY SERVICES: CPT | Performed by: OTOLARYNGOLOGY

## 2019-05-12 NOTE — PROGRESS NOTES
I have reviewed the allergy testing/ mixing and or treatment notes, and procedures, I concur with her/his documentation of Shannon Vega.      Miki Arreguin MD  05/12/19  3:25 PM

## 2019-05-13 ENCOUNTER — CLINICAL SUPPORT (OUTPATIENT)
Dept: OTOLARYNGOLOGY | Facility: CLINIC | Age: 54
End: 2019-05-13

## 2019-05-13 DIAGNOSIS — J30.2 SEASONAL ALLERGIC RHINITIS, UNSPECIFIED TRIGGER: Primary | ICD-10-CM

## 2019-05-13 PROCEDURE — 95117 IMMUNOTHERAPY INJECTIONS: CPT | Performed by: OTOLARYNGOLOGY

## 2019-05-13 RX ORDER — ROSUVASTATIN CALCIUM 5 MG/1
TABLET, COATED ORAL
COMMUNITY
End: 2020-06-10

## 2019-05-13 NOTE — PROGRESS NOTES
ALLERGY SHOT PROCEDURE NOTE     ALLERGY TECHNICIAN:   Raquel Mera RN    ALLERGY HISTORY OF PRESENT ILLNESS:   The patient is a 54 y.o., who returns for immunotherapy injection.   The patient overall has had an improvement of symptoms since the last injection. The patient has had a 80% improvement of sinonasal complaints . That has lasted 10 days. She has had a return of sneezing and itchy nose since the last injection.     INJECTION DETAILS:   Epi pen was presented to me by the patient and checked for expiration date and was found to be in working order. The site of the injection was cleansed with an alcohol swab. Serum was injected into the site after pulling back on the plunger to prevent intravascular injection. The patient showed no signs of immediate reaction. After the injection and was instructed to wait in the allergy waiting area for 30 minutes. After waiting, the patient showed no signs of reaction and was allowed to leave.      Vial safety test  Reaction: 3mm  Side :     right arm      Remainder injection  Reaction: 5mm  Side :     left arm

## 2019-05-13 NOTE — PROGRESS NOTES
I have reviewed the allergy testing/ mixing and or treatment notes, and procedures, I concur with her/his documentation of Shannon Vega.      Miki Arreguin MD  05/13/19  11:44 AM

## 2019-06-09 PROBLEM — Z51.6 ALLERGY DESENSITIZATION THERAPY: Status: ACTIVE | Noted: 2019-06-09

## 2019-06-09 NOTE — PROGRESS NOTES
Miki Arreguin MD     Chief Complaint   Patient presents with   • Sinus Problem     6 month follow-up for seasonal allergies.        History of Present Illness  Shannon Vega is a  54 y.o. female who is here for follow up. She has been on immunotherapy since 2014.  Has been doing well from an allergy standpoint.  She went off his Zyrtec and switch to Claritin and is wondering if she is not doing as well on the new medication.  She is a lot of difficulty with acid reflux and is currently being worked up at [facility].  She has had a globus sensation and a chronic tickly cough.  She has not had voice changes.      Review of Systems  Reviewed per patient intake note    Past History:  Past medical and surgical history, family history and social history reviewed and updated when appropriate.  Current medications and allergies reviewed and updated when appropriate.  Allergies:  Glucosamine and Shellfish-derived products        Vital Signs:   Temp:  [97.4 °F (36.3 °C)] 97.4 °F (36.3 °C)  Heart Rate:  [90] 90  Resp:  [20] 20  BP: (140)/(88) 140/88    Physical Exam:  CONSTITUTIONAL: well nourished, well-developed, alert, oriented for age, in no acute distress  obese- Body mass index is 44.07 kg/m².,  COMMUNICATION AND VOICE: able to communicate normally, normal voice quality  HEAD: normocephalic, no lesions, atraumatic, no tenderness, no masses   FACE: appearance normal, no lesions, no tenderness, no deformities, facial motion symmetric  EYES: ocular motility normal, eyelids normal, orbits normal, no proptosis, conjunctiva normal , pupils equal, round  HEARING: response to conversational voice normal bilaterally   EXTERNAL EARS: auricles without lesions  EXTERNAL NOSE: structure normal, no tenderness on palpation, no nasal discharge, no lesions, no evidence of trauma, nostrils patent  INTRANASAL EXAM: nasal mucosa with mucosal congestion and erythema, nasal septum without overt anterior deviation  LIPS: structure  normal, no tenderness on palpation, no lesions, no evidence of trauma  NECK: neck appearance normal  LYMPH NODES: no lymphadenopathy  CHEST/RESPIRATORY: respiratory effort normal  CARDIOVASCULAR: extremities without cyanosis or edema, no overt jugulovenous distension present  NEUROLOGIC/PSYCHIATRIC: oriented appropriately for age, mood normal, affect appropriate, cranial nerves intact grossly unless specifically mentioned above          Assessment   1. Allergic rhinitis, unspecified seasonality, unspecified trigger    2. Allergy desensitization therapy        Plan    Continue current management plan.  Discussed strategies on antihistamine use including going back to the Zyrtec versus trying Xyzal.  I feel that a lot of her sensitivity in her throat is related to her reflux disease.  I would continue her immunotherapy.  We have refilled her Singulair.          Return in about 1 year (around 6/10/2020).    Miki Arreguin MD  06/10/19  10:13 AM

## 2019-06-10 ENCOUNTER — OFFICE VISIT (OUTPATIENT)
Dept: OTOLARYNGOLOGY | Facility: CLINIC | Age: 54
End: 2019-06-10

## 2019-06-10 VITALS
HEIGHT: 71 IN | SYSTOLIC BLOOD PRESSURE: 140 MMHG | TEMPERATURE: 97.4 F | RESPIRATION RATE: 20 BRPM | OXYGEN SATURATION: 98 % | DIASTOLIC BLOOD PRESSURE: 88 MMHG | WEIGHT: 293 LBS | BODY MASS INDEX: 41.02 KG/M2 | HEART RATE: 90 BPM

## 2019-06-10 DIAGNOSIS — J30.9 ALLERGIC RHINITIS, UNSPECIFIED SEASONALITY, UNSPECIFIED TRIGGER: Primary | ICD-10-CM

## 2019-06-10 DIAGNOSIS — Z51.6 ALLERGY DESENSITIZATION THERAPY: ICD-10-CM

## 2019-06-10 PROCEDURE — 99213 OFFICE O/P EST LOW 20 MIN: CPT | Performed by: OTOLARYNGOLOGY

## 2019-06-10 RX ORDER — MONTELUKAST SODIUM 10 MG/1
10 TABLET ORAL DAILY
Qty: 30 TABLET | Refills: 11 | Status: SHIPPED | OUTPATIENT
Start: 2019-06-10 | End: 2020-06-10 | Stop reason: SDUPTHER

## 2019-06-10 RX ORDER — LORATADINE 10 MG/1
10 TABLET ORAL DAILY
COMMUNITY
End: 2020-12-21

## 2019-06-10 NOTE — PATIENT INSTRUCTIONS
ALL ABOUT HEARTBURN AND THE LIFESTYLE CHANGES THAT HELP    Lifestyle Changes Can Help Relieve The Burning Pain In Your Tummy    What is Heartburn?  Heartburn occurs when the lining of the esophagus (the tube that connects the mouth to the stomach) is exposed to and irritated by stomach acid.  Heartburn often feels like a burning pain in the middle of your chest that moves up your throat.  You may also have the sensation that food has come back into your mouth, leaving a sour or bitter taste.  Almost everyone has heartburn once in a while.  But if you have heartburn 2 or more times per week, it can be a sign of a more serious problem call gastroesophogeal reflux disease, or GERD.    What causes Heartburn?  Heartburn usually occurs when the valve at the ivet of the stomach (the lower esophageal sphincter or LES) doesn’t close the way it should.  If the LES is weak or opens at the wrong time, stomach acid can reflux (flow back) into the esophagus and cause heartburn.  Factors that can affect the LES include:    • Eating or drinking certain foods and beverages such as chocolate, peppermint, fried or fatty foods, coffee, or drinks that contain alcohol  • Having a hiatal hernia - a common physical condition where part of the stomach protrudes up through the diaphragm  • Lying down  • Smoking cigarettes  • Taking certain medications (be sure to tell your doctor about all medications or supplements you take)    What foods can make heartburn worse?  All foods cause the stomach to produce acid, although foods can affect people in different ways.  Following is a list of foods and beverages that may aggravate your symptoms.  You may want to eat them less often.    • Fried or fatty foods  • Heavy seasoning and spicy foods  • Coffee  • Onions  • Orange juice, grapefruit juice, and tomato juice  • Alcoholic beverages  • Chocolate  • Peppermint and spearmint    What else can I do to help control my heartburn?  Try these lifestyle  changes:  • Stop Smoking  • Lose weight - if you’re overweight  • Exercise to help control your weight (talk to your doctor first before starting any exercise program).  • Eat small, well-balanced meals  • Reduce abdominal pressure-don’t wear tight belts or tight clothing  • Avoid eating within 2 hours before bedtime  • Elevate your bed so the head is 6 to 8 inches higher than the foot.  This can help reduce acid reflux at night  • Let your doctor know about all the drugs and supplements you are taking.  Some of them may contribute to your heartburn.    What if these things don’t work?  Talk to your doctor, who can discuss many treatments with you.  Although there are several possible causes of heartburn associated with GERD, they all involve stomach acids.  So no matter what the cause may be, the medicines to reduce stomach acid can prevent heartburn.

## 2019-06-10 NOTE — PROGRESS NOTES
Hannah Powers MA   Patient Intake Note    Review of Systems  Review of Systems   Constitutional: Negative for chills, fatigue and fever.   HENT:        See HPI   Eyes: Negative for pain, discharge, redness and itching.   Respiratory: Positive for cough. Negative for choking and shortness of breath.    Gastrointestinal: Positive for nausea. Negative for diarrhea and vomiting.   Musculoskeletal: Negative for neck pain and neck stiffness.   Neurological: Negative for weakness, light-headedness, numbness and headaches.   Psychiatric/Behavioral: Negative for sleep disturbance.       QUALITY MEASURES    Tobacco Use: Screening and Cessation Intervention  Social History    Tobacco Use      Smoking status: Never Smoker      Smokeless tobacco: Never Used        Hannah Powers MA  6/10/2019  9:52 AM

## 2019-06-28 ENCOUNTER — CLINICAL SUPPORT NO REQUIREMENTS (OUTPATIENT)
Dept: OTOLARYNGOLOGY | Facility: CLINIC | Age: 54
End: 2019-06-28

## 2019-06-28 DIAGNOSIS — J30.89 OTHER ALLERGIC RHINITIS: Primary | ICD-10-CM

## 2019-06-28 PROCEDURE — 95165 ANTIGEN THERAPY SERVICES: CPT | Performed by: OTOLARYNGOLOGY

## 2019-06-29 NOTE — PROGRESS NOTES
I have reviewed the allergy testing/ mixing and or treatment notes, and procedures, I concur with her/his documentation of Shannon Vega.      Miki Arreguin MD  06/29/19  9:56 AM

## 2019-07-08 ENCOUNTER — CLINICAL SUPPORT (OUTPATIENT)
Dept: OTOLARYNGOLOGY | Facility: CLINIC | Age: 54
End: 2019-07-08

## 2019-07-08 DIAGNOSIS — J30.9 ALLERGIC RHINITIS, UNSPECIFIED SEASONALITY, UNSPECIFIED TRIGGER: Primary | ICD-10-CM

## 2019-07-08 PROCEDURE — 95117 IMMUNOTHERAPY INJECTIONS: CPT | Performed by: OTOLARYNGOLOGY

## 2019-07-08 NOTE — PROGRESS NOTES
I have reviewed the allergy testing/ mixing and or treatment notes, and procedures, I concur with her/his documentation of Shannon Vega.      Miki Arreguin MD  07/08/19  1:20 PM

## 2019-07-08 NOTE — PROGRESS NOTES
ALLERGY SHOT PROCEDURE NOTE     ALLERGY TECHNICIAN:   Raquel Mera RN    ALLERGY HISTORY OF PRESENT ILLNESS:   The patient is a 54 y.o., who returns for immunotherapy injection.   The patient overall has had an improvement of symptoms since the last injection. The patient has had a 80% improvement of nasal congestion, sinus pressure, sneezing, itchy nose and itchy eyes . That has lasted 7 days. She has had allergy symptoms recently.    INJECTION DETAILS:   Epi pen was presented to me by the patient and checked for expiration date and was found to be in working order. The site of the injection was cleansed with an alcohol swab. Serum was injected into the site after pulling back on the plunger to prevent intravascular injection. The patient showed no signs of immediate reaction. After the injection and was instructed to wait in the allergy waiting area for 30 minutes. After waiting, the patient showed no signs of reaction and was allowed to leave.    Vial safety test  Reaction: 6mm  Side :     right arm      Maintenance injection  Reaction:  3mm  Side:  Left arm

## 2019-08-16 ENCOUNTER — CLINICAL SUPPORT NO REQUIREMENTS (OUTPATIENT)
Dept: OTOLARYNGOLOGY | Facility: CLINIC | Age: 54
End: 2019-08-16

## 2019-08-16 DIAGNOSIS — J30.89 OTHER ALLERGIC RHINITIS: Primary | ICD-10-CM

## 2019-08-16 PROCEDURE — 95165 ANTIGEN THERAPY SERVICES: CPT | Performed by: OTOLARYNGOLOGY

## 2019-08-26 ENCOUNTER — CLINICAL SUPPORT (OUTPATIENT)
Dept: OTOLARYNGOLOGY | Facility: CLINIC | Age: 54
End: 2019-08-26

## 2019-08-26 DIAGNOSIS — J30.9 ALLERGIC RHINITIS, UNSPECIFIED SEASONALITY, UNSPECIFIED TRIGGER: Primary | ICD-10-CM

## 2019-08-26 PROCEDURE — 95117 IMMUNOTHERAPY INJECTIONS: CPT | Performed by: OTOLARYNGOLOGY

## 2019-08-26 NOTE — PROGRESS NOTES
I have reviewed the allergy testing/ mixing and or treatment notes, and procedures, I concur with her/his documentation of Shannon Vega.      Miki Arreguin MD  08/26/19  4:35 PM

## 2019-10-25 ENCOUNTER — CLINICAL SUPPORT NO REQUIREMENTS (OUTPATIENT)
Dept: OTOLARYNGOLOGY | Facility: CLINIC | Age: 54
End: 2019-10-25

## 2019-10-25 ENCOUNTER — TELEPHONE (OUTPATIENT)
Dept: OTOLARYNGOLOGY | Facility: CLINIC | Age: 54
End: 2019-10-25

## 2019-10-25 DIAGNOSIS — J30.89 OTHER ALLERGIC RHINITIS: Primary | ICD-10-CM

## 2019-10-25 PROCEDURE — 95165 ANTIGEN THERAPY SERVICES: CPT | Performed by: OTOLARYNGOLOGY

## 2019-10-25 NOTE — PROGRESS NOTES
I have reviewed the allergy testing/ mixing and or treatment notes, and procedures, I concur with her/his documentation of Shannon Vega.      Miki Arreguin MD  10/25/19  6:30 PM

## 2019-10-25 NOTE — TELEPHONE ENCOUNTER
Left message about allergy serum vial being available at the [facility] on Monday, October 28, and to call and make appointment.  Reminded the patient that her EpiPen expires December 1, 2019.

## 2019-11-11 ENCOUNTER — CLINICAL SUPPORT (OUTPATIENT)
Dept: OTOLARYNGOLOGY | Facility: CLINIC | Age: 54
End: 2019-11-11

## 2019-11-11 DIAGNOSIS — J30.9 ALLERGIC RHINITIS, UNSPECIFIED SEASONALITY, UNSPECIFIED TRIGGER: ICD-10-CM

## 2019-11-11 PROCEDURE — 95117 IMMUNOTHERAPY INJECTIONS: CPT | Performed by: OTOLARYNGOLOGY

## 2019-11-11 NOTE — PROGRESS NOTES
Deanne A Peek, RN     Allergy Injection Note:    Shannon Vega presents for an immunotherapy injection. The site of the injection was cleansed with an alcohol swab. Serum was injected into the site after pulling back on the plunger to prevent intravascular injection. After the injection and was instructed to wait in the allergy waiting area for 30 minutes. There was no problems with the injection.    Allergy Shot Questionnaire  Have you had increased asthma symptoms in past week?: no  Have you had increased allergy symptoms in the last week?: no  Have you had a cold, respiratory tract infection or flu like symptoms in the past 2 weeks?: no  Did you have any problems within 12 hours of the last injection?: no  Are you on any new medications/ eye drops?: no  Are you on any beta blockers?: no  If female, are you pregnant?: no  I have confirmed the name and birth date on my allergy vial. : yes  Epipen available?: yes  Epipen Lot Number: [id number]  Epipen Expiration Date: Nov 2019  Number of allergy injections given: 2     Injection Details:  Vial 1   Vial 1 Expiration Date: 04/25/20  Shot type: combined maintenance, maintenance  Vial 1 Dose (mL): 0.05 Vial test  Vial 1 Location: Right upper arm  Vial 1 Vial Test Reaction (in mm): <5 3mm    Vial 2  Vial 2 Series: 5  Shot type: maintenance  Vial 2 Location: Left upper arm  Vial 2 Comment: 0.45     Deanne Veloz RN  11/11/2019  12:47 PM

## 2019-11-15 NOTE — PROGRESS NOTES
Miki Arreguin MD     I have reviewed the notes, assessments, and/or procedures and I concur with this documentation of Shannon Vega.    Miki Arreguin MD  11/15/2019  4:12 PM

## 2019-11-18 DIAGNOSIS — J30.9 CHRONIC ALLERGIC RHINITIS: ICD-10-CM

## 2019-11-18 RX ORDER — EPINEPHRINE 0.3 MG/.3ML
INJECTION SUBCUTANEOUS
Qty: 1 EACH | Refills: 0 | Status: SHIPPED | OUTPATIENT
Start: 2019-11-18 | End: 2021-01-07 | Stop reason: SDUPTHER

## 2020-01-07 ENCOUNTER — CLINICAL SUPPORT (OUTPATIENT)
Dept: OTOLARYNGOLOGY | Facility: CLINIC | Age: 55
End: 2020-01-07

## 2020-01-07 DIAGNOSIS — J30.9 CHRONIC ALLERGIC RHINITIS: Primary | ICD-10-CM

## 2020-01-07 PROCEDURE — 95115 IMMUNOTHERAPY ONE INJECTION: CPT | Performed by: PHYSICIAN ASSISTANT

## 2020-01-07 NOTE — PROGRESS NOTES
Alon Boothe   Allergy Injection Note:    Shannon Vega presents for an immunotherapy injection. The site of the injection was cleansed with an alcohol swab. Serum was injected into the site after pulling back on the plunger to prevent intravascular injection. After the injection and was instructed to wait in the allergy waiting area for 30 minutes. There was no problems with the injection.    Allergy Shot Questionnaire  Injection nurse/assistant: Alon Boothe ST/AT  Have you had increased asthma symptoms in past week?: no  Have you had increased allergy symptoms in the last week?: no  Have you had a cold, respiratory tract infection or flu like symptoms in the past 2 weeks?: no  Did you have any problems within 12 hours of the last injection?: no  Are you on any new medications/ eye drops?: no  Are you on any beta blockers?: no  If female, are you pregnant?: no  I have confirmed the name and birth date on my allergy vial. : yes  Epipen available?: yes  Epipen Lot Number: [id number]  Epipen Expiration Date: Nov 2019  Number of allergy injections given: 1     Injection Details:  Vial 1   Vial 1 Expiration Date: 03/25/20  Vial 1 Series: 5  Shot type: maintenance  Vial 1 Dose (mL): 0.5  Vial 1 Location: Right upper arm  Vial 1 Reaction (in mm): <5          Alon Boothe  1/7/2020  1:48 PM

## 2020-01-13 ENCOUNTER — CLINICAL SUPPORT NO REQUIREMENTS (OUTPATIENT)
Dept: OTOLARYNGOLOGY | Facility: CLINIC | Age: 55
End: 2020-01-13

## 2020-01-13 DIAGNOSIS — J30.89 OTHER ALLERGIC RHINITIS: Primary | ICD-10-CM

## 2020-01-13 PROCEDURE — 95165 ANTIGEN THERAPY SERVICES: CPT | Performed by: OTOLARYNGOLOGY

## 2020-01-14 NOTE — PROGRESS NOTES
I have reviewed the allergy testing/ mixing and or treatment notes, and procedures, I concur with her/his documentation of Shannon Vega.      Miki Arreguin MD  01/14/20  3:59 PM

## 2020-03-13 ENCOUNTER — CLINICAL SUPPORT NO REQUIREMENTS (OUTPATIENT)
Dept: OTOLARYNGOLOGY | Facility: CLINIC | Age: 55
End: 2020-03-13

## 2020-03-13 DIAGNOSIS — J30.89 OTHER ALLERGIC RHINITIS: Primary | ICD-10-CM

## 2020-03-13 PROCEDURE — 95165 ANTIGEN THERAPY SERVICES: CPT | Performed by: OTOLARYNGOLOGY

## 2020-03-16 NOTE — PROGRESS NOTES
I have reviewed the allergy testing/ mixing and or treatment notes, and procedures, I concur with her/his documentation of Shannon Vega.      Miki Arreguin MD  03/15/20  9:00 PM

## 2020-06-02 ENCOUNTER — CLINICAL SUPPORT NO REQUIREMENTS (OUTPATIENT)
Dept: OTOLARYNGOLOGY | Facility: CLINIC | Age: 55
End: 2020-06-02

## 2020-06-02 DIAGNOSIS — J30.89 OTHER ALLERGIC RHINITIS: Primary | ICD-10-CM

## 2020-06-02 PROCEDURE — 95165 ANTIGEN THERAPY SERVICES: CPT | Performed by: OTOLARYNGOLOGY

## 2020-06-02 NOTE — PROGRESS NOTES
I have reviewed the notes, assessments, and/or procedures of this encounter and I concur with the documentation on Shannon Vega.      Miki Arreguin MD  06/02/20  9:36 AM

## 2020-06-10 ENCOUNTER — TELEMEDICINE (OUTPATIENT)
Dept: OTOLARYNGOLOGY | Facility: CLINIC | Age: 55
End: 2020-06-10

## 2020-06-10 DIAGNOSIS — J30.9 CHRONIC ALLERGIC RHINITIS: Primary | ICD-10-CM

## 2020-06-10 PROCEDURE — 99213 OFFICE O/P EST LOW 20 MIN: CPT | Performed by: OTOLARYNGOLOGY

## 2020-06-10 RX ORDER — MONTELUKAST SODIUM 10 MG/1
10 TABLET ORAL DAILY
Qty: 30 TABLET | Refills: 11 | Status: SHIPPED | OUTPATIENT
Start: 2020-06-10 | End: 2020-12-21 | Stop reason: SDUPTHER

## 2020-06-10 NOTE — PROGRESS NOTES
Miki Arreguin MD     FOLLOW UP VIDEO VISIT   1. This service was provided via Telemedicine connected with: [employer].    2. TeleMed provider: Miki Arreguin MD   3. Provider location: [facility] ENT clinic  4. Additional parties in room with patient during tele consult: Raquel Mera RN  5. After connecting through audio and visual connection, the patient was identified by name and date of birth. We discussed that this was a Telemedicine visit and that the visit was being conducted confidentially over secure lines. Consent and understanding of privacy and security of the Telemedicine visit was demonstrated. I have reviewed the medical record in [employer] and presented the opportunity to ask any questions regarding the visit today. The advantages and limitations of video visits were discussed and understood. Consent was given to participate.     Chief Complaint   Patient presents with   • Immunotherapy        HPI  Shannon Vega is a 55 y.o. female who presents for a video follow up visit. She has had no current complaints. The patient has not had complaints of: nasal congestion, drainage, facial swelling, facial pain or sinusitis.    Review of Systems   Constitutional: Negative for chills and fever.   HENT: Negative for ear discharge and ear pain.    Eyes: Negative for pain and itching.   Respiratory: Negative for cough, choking and shortness of breath.    Gastrointestinal: Negative for diarrhea, nausea and vomiting.   Hematological: Does not bruise/bleed easily.   Psychiatric/Behavioral: Negative for sleep disturbance.   All other systems reviewed and are negative.       Past History:   Past medical and surgical history, family history and social history reviewed and updated when appropriate.  Current medications and allergies reviewed and updated when appropriate.  Allergies:  Glucosamine and Shellfish-derived products          Virtual Visit Physical Exam   CONSTITUTIONAL:  well nourished, well-developed, alert, oriented, in no acute distress  COMMUNICATION AND VOICE: able to communicate normally, normal voice quality   HEAD: normocephalic, no lesions, atraumatic, no masses  FACE: appearance normal, no lesions, no deformities, facial motion symmetric  EYES: ocular motility normal, eyelids normal, orbits normal, no proptosis, conjunctiva normal, pupils equal, round  HEARING: response to conversational voice normal bilaterally  EXTERNAL EARS: auricles without lesions  EXTERNAL NOSE: structure normal, no nasal discharge, no lesions, no evidence of trauma, nostrils patent  LIPS: structure normal, no lesions, no evidence of trauma  NECK: neck appearance normal, no visible masses  LYMPH NODES: no visible lymphadenopathy  CHEST/RESPIRATORY: respiratory effort normal, no work of breathing, no audible wheezes or stridor  CARDIOVASCULAR: no visual jugulovenous distension present  NEUROLOGIC/PSYCHIATRIC: oriented appropriately for age, mood normal, affect appropriate, cranial nerves intact grossly unless specifically mentioned above      RESULTS REVIEW:    I have reviewed the patients old records in the chart.       Assessment/Plan    Assessment:   1. Chronic allergic rhinitis        Plan:      Continue current management plan.     New Medications Ordered This Visit   Medications   • montelukast (SINGULAIR) 10 MG tablet     Sig: Take 1 tablet by mouth Daily.     Dispense:  30 tablet     Refill:  11          Return in about 6 months (around 12/10/2020) for follow up video visit.        Total visit time: Approximately 15 minutes with 5 additional minutes of pre-charting and chart review    Miki Arreguin MD  06/10/20  09:42

## 2020-07-24 ENCOUNTER — CLINICAL SUPPORT NO REQUIREMENTS (OUTPATIENT)
Dept: OTOLARYNGOLOGY | Facility: CLINIC | Age: 55
End: 2020-07-24

## 2020-07-24 DIAGNOSIS — J30.89 OTHER ALLERGIC RHINITIS: Primary | ICD-10-CM

## 2020-07-24 PROCEDURE — 95165 ANTIGEN THERAPY SERVICES: CPT | Performed by: OTOLARYNGOLOGY

## 2020-07-24 NOTE — PROGRESS NOTES
I have reviewed the notes, assessments, and/or procedures of this encounter and I concur with the documentation on Shannon Vega.      Miki Arreguin MD  07/24/20  2:10 PM

## 2020-09-24 ENCOUNTER — CLINICAL SUPPORT NO REQUIREMENTS (OUTPATIENT)
Dept: OTOLARYNGOLOGY | Facility: CLINIC | Age: 55
End: 2020-09-24

## 2020-09-24 DIAGNOSIS — J30.89 OTHER ALLERGIC RHINITIS: Primary | ICD-10-CM

## 2020-09-24 PROCEDURE — 95165 ANTIGEN THERAPY SERVICES: CPT | Performed by: OTOLARYNGOLOGY

## 2020-09-24 NOTE — PROGRESS NOTES
Alon Boothe   Allergy Injection Mix Note    A immunotherapy injection vial was mixed using standard protocol under sterile conditions.     Mixing Nurse/ Tech: Alon Boothe ST/AT (09/24/20 1057)    Vial Series: 4    VIAL 1  Eastern Tree Mix: Vial 1 mix 0.2 cc of #: concentrate  Bermuda Grass: Vial 1 mix 0.2 cc of #: concentrate  Eladio Grass: Vial 1 mix 0.2 cc of #: concentrate  [place] Bluegrass: Vial 1 mix 0.2 cc of #: concentrate  Ragweed Mix: Vial 1 mix 0.2 cc of #: concentrate  Mold Mix: Vial 1 mix 0.2 cc of #: concentrate  Phycomycetes: Vial 1 mix 0.2 cc of #: concentrate  Candida: Vial 1 mix 0.2 cc of #: concentrate  Trichophyton: Vial 1 mix 0.2 cc of #: concentrate  Cockroach: Vial 1 mix 0.2 cc of #: concentrate  Dog: Vial 1 mix 0.2 cc of #: concentrate  Horse: Vial 1 mix 0.2 cc of #: concentrate  Vial 1 Additions  Antigen Total: 2.4 cc  Glycerine: 0  Dilutent: 2.6 cc  TOTAL: 5           Alon Boothe  9/24/2020  10:57 CDT

## 2020-09-25 NOTE — PROGRESS NOTES
I have reviewed the notes, assessments, and/or procedures of this encounter and I concur with the documentation on Shannon Vega.      Miki Arreguin MD  09/25/20  3:13 PM CDT

## 2020-12-04 ENCOUNTER — CLINICAL SUPPORT NO REQUIREMENTS (OUTPATIENT)
Dept: OTOLARYNGOLOGY | Facility: CLINIC | Age: 55
End: 2020-12-04

## 2020-12-04 DIAGNOSIS — J30.89 OTHER ALLERGIC RHINITIS: Primary | ICD-10-CM

## 2020-12-04 PROCEDURE — 95165 ANTIGEN THERAPY SERVICES: CPT | Performed by: OTOLARYNGOLOGY

## 2020-12-04 NOTE — PROGRESS NOTES
Alon Boothe   Allergy Injection Mix Note    A immunotherapy injection vial was mixed using standard protocol under sterile conditions.     Mixing Nurse/ Tech: Alon Boothe ST/AT (12/04/20 1126)    Vial Series: 5    VIAL 1  Eastern Tree Mix: Vial 1 mix 0.2 cc of #: concentrate  [place] Grass: Vial 1 mix 0.2 cc of #: concentrate  Eladio Grass: Vial 1 mix 0.2 cc of #: concentrate  [place] Bluegrass: Vial 1 mix 0.2 cc of #: concentrate  Ragweed Mix: Vial 1 mix 0.2 cc of #: concentrate  Mold Mix: Vial 1 mix 0.2 cc of #: concentrate  Phycomycetes: Vial 1 mix 0.2 cc of #: concentrate  Candida: Vial 1 mix 0.2 cc of #: concentrate  Trichophyton: Vial 1 mix 0.2 cc of #: concentrate  Cockroach: Vial 1 mix 0.2 cc of #: concentrate  Dog: Vial 1 mix 0.2 cc of #: concentrate  Horse: Vial 1 mix 0.2 cc of #: concentrate  Vial 1 Additions  Antigen Total: 2.4 cc  Glycerine: 0  Dilutent: 2.6 cc  TOTAL: 5           Alon Boothe  12/4/2020  11:27 CST

## 2020-12-04 NOTE — PROGRESS NOTES
I have reviewed the notes, assessments, and/or procedures of this encounter and I concur with the documentation on Shannon Vega.      Miki Arreguin MD  12/04/20  2:03 PM CST

## 2020-12-20 PROBLEM — J30.9 ALLERGIC RHINITIS: Chronic | Status: ACTIVE | Noted: 2017-06-12

## 2020-12-20 PROBLEM — J30.89 NON-SEASONAL ALLERGIC RHINITIS DUE TO FUNGAL SPORES: Chronic | Status: ACTIVE | Noted: 2020-12-20

## 2020-12-20 PROBLEM — J30.1 SEASONAL ALLERGIC RHINITIS DUE TO POLLEN: Status: ACTIVE | Noted: 2017-06-12

## 2020-12-20 PROBLEM — J30.1 SEASONAL ALLERGIC RHINITIS DUE TO POLLEN: Chronic | Status: ACTIVE | Noted: 2017-06-12

## 2020-12-20 PROBLEM — J30.81 ALLERGIC RHINITIS DUE TO ANIMAL HAIR AND DANDER: Chronic | Status: ACTIVE | Noted: 2020-12-20

## 2020-12-21 ENCOUNTER — TELEMEDICINE (OUTPATIENT)
Dept: OTOLARYNGOLOGY | Facility: CLINIC | Age: 55
End: 2020-12-21

## 2020-12-21 DIAGNOSIS — J30.89 NON-SEASONAL ALLERGIC RHINITIS DUE TO FUNGAL SPORES: Chronic | ICD-10-CM

## 2020-12-21 DIAGNOSIS — J30.81 ALLERGIC RHINITIS DUE TO ANIMAL HAIR AND DANDER: Primary | Chronic | ICD-10-CM

## 2020-12-21 DIAGNOSIS — J30.1 SEASONAL ALLERGIC RHINITIS DUE TO POLLEN: Chronic | ICD-10-CM

## 2020-12-21 PROCEDURE — 99213 OFFICE O/P EST LOW 20 MIN: CPT | Performed by: OTOLARYNGOLOGY

## 2020-12-21 RX ORDER — FLUTICASONE PROPIONATE 50 MCG
2 SPRAY, SUSPENSION (ML) NASAL DAILY
Qty: 1 BOTTLE | Refills: 6 | Status: SHIPPED | OUTPATIENT
Start: 2020-12-21 | End: 2021-06-14

## 2020-12-21 RX ORDER — AZELASTINE 1 MG/ML
2 SPRAY, METERED NASAL 2 TIMES DAILY
Qty: 30 ML | Refills: 6 | Status: SHIPPED | OUTPATIENT
Start: 2020-12-21 | End: 2021-01-20

## 2020-12-21 RX ORDER — ARIPIPRAZOLE 5 MG/1
5 TABLET ORAL DAILY
COMMUNITY
End: 2023-03-28

## 2020-12-21 RX ORDER — MONTELUKAST SODIUM 10 MG/1
10 TABLET ORAL DAILY
Qty: 30 TABLET | Refills: 11 | Status: SHIPPED | OUTPATIENT
Start: 2020-12-21 | End: 2022-01-24

## 2020-12-21 RX ORDER — BUPROPION HYDROCHLORIDE 150 MG/1
150 TABLET ORAL DAILY
COMMUNITY
Start: 2020-12-08 | End: 2023-03-28

## 2020-12-21 RX ORDER — FENOFIBRATE 160 MG/1
TABLET ORAL
COMMUNITY
Start: 2020-09-25 | End: 2023-03-28

## 2020-12-21 NOTE — PATIENT INSTRUCTIONS
For the best response, use your nasal sprays every day without skipping doses. It may take several weeks before the full effect is acheived.   Neilmed Saline Nasal Rinse  http://www.reportbrain.[employer]/usa/directions-for-use.php        Step 1  Please wash your hands. Fill the clean bottle with the designated volume of either distilled, micro-filtered (through 0.2 micron filter), commercially bottled or previously boiled and cooled down water. Always rinse your nasal passages with NeilMed® Sinus Rinse™ packets only. Our packets contain a mixture of USP grade sodium chloride and sodium bicarbonate. These ingredients are of the purest quality available to make the dry powder mixture. Rinsing your nasal passages with only plain water without our mixture will result in a severe burning sensation as the plain water is not physiologic for your nasal lining, even if it is appropriate for drinking. Additionally, for your safety, do not use tap or faucet water for dissolving the mixture unless it has been previously boiled for five minutes or more as boiling sterilizes the water. Other choices are distilled, micro-filtered (through 0.2 micron), commercially bottled or, as mentioned earlier, previously boiled water at lukewarm or body temperature. You can store boiled water in a clean container for seven days or more if refrigerated. Do not use non-chlorinated or non-ultra (0.2 micron) filtered well water unless it is boiled and then cooled to lukewarm or body temperature.  *You may warm the water in a microwave in increments of 5 to 10 seconds to avoid overheating the water, damaging the device or scalding your nasal passage.   Step 2  Cut the Sinus Rinse™ mixture packet at the corner and pour its contents into the bottle. Tighten the cap and tube on the bottle securely. Place one finger over the tip of the cap and shake the bottle gently to dissolve the mixture.   Step 3  Standing in front of a sink, bend forward to your comfort  level and tilt your head down. Keeping your mouth open, without holding your breath, place the cap snugly against your nasal passage. SQUEEZE BOTTLE GENTLY until the solution starts draining from the OPPOSITE nasal passage. Some may drain from your mouth. For a proper rinse, keep squeezing the bottle GENTLY until at least 1/4 to 1/2 (60 mL to 120 mL or 2 to 4 fl oz) of the bottle is used. Do not swallow the solution.   Step 4  Blow your nose very gently, without pinching nose completely to avoid pressure on eardrums. If tolerable, sniff in gently any residual solution remaining in the nasal passage once or twice, because this may clean out the posterior nasopharyngeal area, which is the area at the back of your nasal passage. At times, some solution will reach the back of your throat, so please spit it out. To help drain any residual solution, blow your nose gently while tilting your head forward and to the opposite side of the nasal passage you just rinsed.  Step 5  Now repeat steps 3 and 4 for your other nasal passage. Most users fi nd that rinsing twice a day is beneficial, similar to brushing your teeth. Many doctors recommend rinsing 3-4 times daily or for special circumstances, even rinsing up to 6 times a day is safe. Please follow your physician’s advice.        CONTACT INFORMATION:  The main office phone number is [phone]. For emergencies after hours and on weekends, this number will convert over to our answering service and the on call provider will answer. Please try to keep non emergent phone calls/ questions to office hours 9am-5pm Monday through Friday.     [employer]  As an alternative, you can sign up and use the Epic MyChart system for more direct and quicker access for non emergent questions/ problems.  [facility] allows you to send messages to your doctor, view your test results, renew your prescriptions, schedule appointments, and more. To sign up, go to [employer]  and click on the Sign Up Now link in the New User? box. Enter your VTEX Activation Code exactly as it appears below along with the last four digits of your Social Security Number and your Date of Birth () to complete the sign-up process. If you do not sign up before the expiration date, you must request a new code.    VTEX Activation Code: Activation code not generated  Current VTEX Status: Active    If you have questions, you can email [email or web address] or call [phone] to talk to our [employer] staff. Remember, VTEX is NOT to be used for urgent needs. For medical emergencies, dial 911.

## 2020-12-21 NOTE — PROGRESS NOTES
Miki Arreguin MD     FOLLOW UP VIDEO VISIT   1. This service was provided via Telemedicine connected with: [employer]/ [email or web address].    2. TeleMed provider: Miki Arreguin MD   3. Provider location: [facility] ENT clinic  4. Additional parties in room with patient during tele consult: Raquel Mera RN  5. After connecting through audio and visual connection, the patient was identified by name and date of birth. We discussed that this was a Telemedicine visit and that the visit was being conducted confidentially over secure lines. Consent and understanding of privacy and security of the Telemedicine visit was demonstrated. I have reviewed the medical record in [id number] and presented the opportunity to ask any questions regarding the visit today. The advantages and limitations of video visits were discussed and understood. Consent was given to participate.     Chief Complaint   Patient presents with   • Immunotherapy        HPI  Shannon Vega is a 55 y.o. female who presents for a video follow up visit. She has had slight increase in nasal congestion, sneezing and allergy complaints.  She has been on immunotherapy since 2014.  She reports she is using her Flonase on an as-needed basis.  She is using Mucinex and as needed Zyrtec.    Review of Systems   Constitutional: Negative for chills and fever.   HENT: Negative for postnasal drip and rhinorrhea.    Eyes: Negative for discharge, redness and itching.   Gastrointestinal: Negative for diarrhea, nausea and vomiting.   Neurological: Negative for dizziness and headaches.   Hematological: Does not bruise/bleed easily.   Psychiatric/Behavioral: Negative for sleep disturbance.        Past History:   Past medical and surgical history, family history and social history reviewed and updated when appropriate.  Current medications and allergies reviewed and updated when appropriate.  Allergies:  Glucosamine and Shellfish-derived products           Virtual Visit Physical Exam   CONSTITUTIONAL: well nourished, well-developed, alert, oriented, in no acute distress  COMMUNICATION AND VOICE: able to communicate normally, normal voice quality   HEAD: normocephalic, no lesions, atraumatic, no masses  FACE: appearance normal, no lesions, no deformities, facial motion symmetric  EYES: ocular motility normal, eyelids normal, orbits normal, no proptosis, conjunctiva normal, pupils equal, round  HEARING: response to conversational voice normal bilaterally  EXTERNAL EARS: auricles without lesions  EXTERNAL NOSE: structure normal, no nasal discharge, no lesions, no evidence of trauma, nostrils patent  LIPS: structure normal, no lesions, no evidence of trauma  NECK: neck appearance normal, no visible masses  LYMPH NODES: no visible lymphadenopathy  CHEST/RESPIRATORY: respiratory effort normal, no work of breathing, no audible wheezes or stridor  CARDIOVASCULAR: no visual jugulovenous distension present  NEUROLOGIC/PSYCHIATRIC: oriented appropriately for age, mood normal, affect appropriate, cranial nerves intact grossly unless specifically mentioned above      RESULTS REVIEW:    I have reviewed the patients old records in the chart.       Assessment/Plan    Assessment:   1. Allergic rhinitis due to animal hair and dander    2. Non-seasonal allergic rhinitis due to fungal spores    3. Seasonal allergic rhinitis due to pollen        Plan:      For the best response, use your nasal sprays every day without skipping doses. It may take several weeks before the full effect is acheived.      New Medications Ordered This Visit   Medications   • azelastine (ASTELIN) 0.1 % nasal spray     Si sprays into the nostril(s) as directed by provider 2 (Two) Times a Day for 30 days. Use in each nostril as directed     Dispense:  30 mL     Refill:  6   • fluticasone (FLONASE) 50 MCG/ACT nasal spray     Si sprays into the nostril(s) as directed by provider Daily.     Dispense:  1  bottle     Refill:  6   • montelukast (SINGULAIR) 10 MG tablet     Sig: Take 1 tablet by mouth Daily.     Dispense:  30 tablet     Refill:  11          Return in about 3 months (around 3/21/2021) for video visit.        Total visit time: Approximately 20 minute    Miki Arreguin MD  12/21/20  12:13 CST

## 2021-01-07 DIAGNOSIS — J30.9 CHRONIC ALLERGIC RHINITIS: ICD-10-CM

## 2021-01-07 RX ORDER — EPINEPHRINE 0.3 MG/.3ML
INJECTION SUBCUTANEOUS
Qty: 1 EACH | Refills: 0 | Status: SHIPPED | OUTPATIENT
Start: 2021-01-07 | End: 2021-03-04

## 2021-03-01 ENCOUNTER — CLINICAL SUPPORT NO REQUIREMENTS (OUTPATIENT)
Dept: OTOLARYNGOLOGY | Facility: CLINIC | Age: 56
End: 2021-03-01

## 2021-03-01 DIAGNOSIS — J30.89 OTHER ALLERGIC RHINITIS: Primary | ICD-10-CM

## 2021-03-01 PROCEDURE — 95165 ANTIGEN THERAPY SERVICES: CPT | Performed by: OTOLARYNGOLOGY

## 2021-03-01 NOTE — PROGRESS NOTES
I have reviewed the notes, assessments, and/or procedures of this encounter and I concur with the documentation on Shannon Vega.      Miki Arreguin MD  03/01/21  2:46 PM CST

## 2021-03-01 NOTE — PROGRESS NOTES
Alon Boothe   Allergy Injection Mix Note    A immunotherapy injection vial was mixed using standard protocol under sterile conditions.     Mixing Nurse/ Tech: Alon Boothe ST/AT (03/01/21 9948)    Vial Series: 5    VIAL 1  Eastern Tree Mix: Vial 1 mix 0.2 cc of #: concentrate  Bermuda Grass: Vial 1 mix 0.2 cc of #: concentrate  Eladio Grass: Vial 1 mix 0.2 cc of #: concentrate  [place] Bluegrass: Vial 1 mix 0.2 cc of #: concentrate  Ragweed Mix: Vial 1 mix 0.2 cc of #: concentrate  Mold Mix: Vial 1 mix 0.2 cc of #: concentrate  Phycomycetes: Vial 1 mix 0.2 cc of #: concentrate  Candida: Vial 1 mix 0.2 cc of #: concentrate  Trichophyton: Vial 1 mix 0.2 cc of #: concentrate  Cockroach: Vial 1 mix 0.2 cc of #: concentrate  Dog: Vial 1 mix 0.2 cc of #: concentrate  Horse: Vial 1 mix 0.2 cc of #: concentrate  Vial 1 Additions  Antigen Total: 2.4 cc  Glycerine: 0  Dilutent: 2.6 cc  TOTAL: 5           Alon Boothe  3/1/2021  13:25 CST

## 2021-03-04 DIAGNOSIS — J30.9 CHRONIC ALLERGIC RHINITIS: ICD-10-CM

## 2021-03-04 RX ORDER — EPINEPHRINE 0.3 MG/.3ML
INJECTION SUBCUTANEOUS
Qty: 2 EACH | Refills: 0 | Status: SHIPPED | OUTPATIENT
Start: 2021-03-04 | End: 2021-11-18 | Stop reason: SDUPTHER

## 2021-04-19 NOTE — PROGRESS NOTES
I have reviewed the notes, and procedures performed by Alon Boothe, Allergy Tech, I concur with her/his documentation of Shannon Vega.      Miki Arreguin MD  03/01/19  2:55 PM       Yes

## 2021-05-21 ENCOUNTER — CLINICAL SUPPORT NO REQUIREMENTS (OUTPATIENT)
Dept: OTOLARYNGOLOGY | Facility: CLINIC | Age: 56
End: 2021-05-21

## 2021-05-21 ENCOUNTER — TELEPHONE (OUTPATIENT)
Dept: OTOLARYNGOLOGY | Facility: CLINIC | Age: 56
End: 2021-05-21

## 2021-05-21 DIAGNOSIS — J30.89 OTHER ALLERGIC RHINITIS: Primary | ICD-10-CM

## 2021-05-21 PROCEDURE — 95165 ANTIGEN THERAPY SERVICES: CPT | Performed by: OTOLARYNGOLOGY

## 2021-05-21 NOTE — PROGRESS NOTES
Alon Boothe   Allergy Injection Mix Note    A immunotherapy injection vial was mixed using standard protocol under sterile conditions.     Mixing Nurse/ Tech: Alon Boothe ST/AT (05/21/21 1055)    Vial Series: 5    VIAL 1  Eastern Tree Mix: Vial 1 mix 0.2 cc of #: concentrate  [place]erm[place] Grass: Vial 1 mix 0.2 cc of #: concentrate  Eladio Grass: Vial 1 mix 0.2 cc of #: concentrate  [place] Bluegrass: Vial 1 mix 0.2 cc of #: concentrate  Ragweed Mix: Vial 1 mix 0.2 cc of #: concentrate  Mold Mix: Vial 1 mix 0.2 cc of #: concentrate  Phycomycetes: Vial 1 mix 0.2 cc of #: concentrate  Candida: Vial 1 mix 0.2 cc of #: concentrate  Trichophyton: Vial 1 mix 0.2 cc of #: concentrate  Cockroach: Vial 1 mix 0.2 cc of #: concentrate  Dog: Vial 1 mix 0.2 cc of #: concentrate  Horse: Vial 1 mix 0.2 cc of #: concentrate  Vial 1 Additions  Antigen Total: 2.4 cc  Glycerine: 0  Dilutent: 2.6 cc  TOTAL: 5           Alon Boothe  5/21/2021  10:55 CDT

## 2021-05-21 NOTE — PROGRESS NOTES
I have reviewed the notes, assessments, and/or procedures of this encounter and I concur with the documentation on Shannon Vega.      Miki Arreguin MD  05/21/21  5:35 PM CDT

## 2021-06-14 RX ORDER — FLUTICASONE PROPIONATE 50 MCG
SPRAY, SUSPENSION (ML) NASAL
Qty: 48 ML | Refills: 2 | Status: SHIPPED | OUTPATIENT
Start: 2021-06-14 | End: 2022-01-17

## 2021-07-16 ENCOUNTER — CLINICAL SUPPORT NO REQUIREMENTS (OUTPATIENT)
Dept: OTOLARYNGOLOGY | Facility: CLINIC | Age: 56
End: 2021-07-16

## 2021-07-16 DIAGNOSIS — J30.89 OTHER ALLERGIC RHINITIS: Primary | ICD-10-CM

## 2021-07-16 PROCEDURE — 95165 ANTIGEN THERAPY SERVICES: CPT | Performed by: OTOLARYNGOLOGY

## 2021-07-16 NOTE — PROGRESS NOTES
Alon Boothe   Allergy Injection Mix Note    A immunotherapy injection vial was mixed using standard protocol under sterile conditions.     Mixing Nurse/ Tech: Alon Boothe ST/AT (07/16/21 0900)    Vial Series: 5    VIAL 1  Eastern Tree Mix: Vial 1 mix 0.2 cc of #: concentrate  [place] Grass: Vial 1 mix 0.2 cc of #: concentrate  Eladio Grass: Vial 1 mix 0.2 cc of #: concentrate  [place]: Vial 1 mix 0.2 cc of #: concentrate  Ragweed Mix: Vial 1 mix 0.2 cc of #: concentrate  Mold Mix: Vial 1 mix 0.2 cc of #: concentrate  Phycomycetes: Vial 1 mix 0.2 cc of #: concentrate  Candida: Vial 1 mix 0.2 cc of #: concentrate  Trichophyton: Vial 1 mix 0.2 cc of #: concentrate  Cockroach: Vial 1 mix 0.2 cc of #: concentrate  Dog: Vial 1 mix 0.2 cc of #: concentrate  Horse: Vial 1 mix 0.2 cc of #: concentrate  Vial 1 Additions  Antigen Total: 2.4 cc  Glycerine: 0  Dilutent: 2.6 cc  TOTAL: 5           Alon Boothe  7/16/2021  09:01 CDT

## 2021-07-16 NOTE — PROGRESS NOTES
I have reviewed the notes, assessments, and/or procedures of this encounter and I concur with the documentation on Shannon Vega.      Miki Arreguin MD  07/16/21  1:21 PM CDT

## 2021-09-21 ENCOUNTER — CLINICAL SUPPORT NO REQUIREMENTS (OUTPATIENT)
Dept: OTOLARYNGOLOGY | Facility: CLINIC | Age: 56
End: 2021-09-21

## 2021-09-21 DIAGNOSIS — J30.89 OTHER ALLERGIC RHINITIS: Primary | ICD-10-CM

## 2021-09-21 PROCEDURE — 95165 ANTIGEN THERAPY SERVICES: CPT | Performed by: OTOLARYNGOLOGY

## 2021-09-21 NOTE — PROGRESS NOTES
Alon Boothe   Allergy Injection Mix Note    A immunotherapy injection vial was mixed using standard protocol under sterile conditions.     Mixing Nurse/ Tech: Alon Boothe ST/AT (09/21/21 1241)    Vial Series: 5    VIAL 1  Eastern Tree Mix: Vial 1 mix 0.2 cc of #: concentrate  [place] Grass: Vial 1 mix 0.2 cc of #: concentrate  Eladio Grass: Vial 1 mix 0.2 cc of #: concentrate  [place]: Vial 1 mix 0.2 cc of #: concentrate  Ragweed Mix: Vial 1 mix 0.2 cc of #: concentrate  Mold Mix: Vial 1 mix 0.2 cc of #: concentrate  Phycomycetes: Vial 1 mix 0.2 cc of #: concentrate  Candida: Vial 1 mix 0.2 cc of #: concentrate  Trichophyton: Vial 1 mix 0.2 cc of #: concentrate  Cockroach: Vial 1 mix 0.2 cc of #: concentrate  Dog: Vial 1 mix 0.2 cc of #: concentrate  Horse: Vial 1 mix 0.2 cc of #: concentrate  Vial 1 Additions  Antigen Total: 2.4 cc  Glycerine: 0  Dilutent: 2.6 cc  TOTAL: 5           Alon Boothe  9/21/2021  12:42 CDT

## 2021-09-21 NOTE — PROGRESS NOTES
I have reviewed the notes, assessments, and/or procedures of this encounter and I concur with the documentation on Shannon Vega.      Miki Arreguin MD  09/21/21  1:03 PM CDT

## 2021-11-18 DIAGNOSIS — J30.9 CHRONIC ALLERGIC RHINITIS: ICD-10-CM

## 2021-11-18 RX ORDER — EPINEPHRINE 0.3 MG/.3ML
INJECTION SUBCUTANEOUS
Qty: 2 EACH | Refills: 0 | Status: SHIPPED | OUTPATIENT
Start: 2021-11-18 | End: 2022-01-24 | Stop reason: SDUPTHER

## 2021-11-19 ENCOUNTER — CLINICAL SUPPORT NO REQUIREMENTS (OUTPATIENT)
Dept: OTOLARYNGOLOGY | Facility: CLINIC | Age: 56
End: 2021-11-19

## 2021-11-19 DIAGNOSIS — J30.89 OTHER ALLERGIC RHINITIS: Primary | ICD-10-CM

## 2021-11-19 PROCEDURE — 95165 ANTIGEN THERAPY SERVICES: CPT | Performed by: OTOLARYNGOLOGY

## 2021-11-19 NOTE — PROGRESS NOTES
Alon Boothe   Allergy Injection Mix Note    A immunotherapy injection vial was mixed using standard protocol under sterile conditions.     Mixing Nurse/ Tech: Alon Boothe ST/AT (11/19/21 1120)    Vial Series: 5    VIAL 1  Eastern Tree Mix: Vial 1 mix 0.2 cc of #: concentrate  [place] Grass: Vial 1 mix 0.2 cc of #: concentrate  Eladio Grass: Vial 1 mix 0.2 cc of #: concentrate  [place]: Vial 1 mix 0.2 cc of #: concentrate  Ragweed Mix: Vial 1 mix 0.2 cc of #: concentrate  Mold Mix: Vial 1 mix 0.2 cc of #: concentrate  Phycomycetes: Vial 1 mix 0.2 cc of #: concentrate  Candida: Vial 1 mix 0.2 cc of #: concentrate  Trichophyton: Vial 1 mix 0.2 cc of #: concentrate  Cockroach: Vial 1 mix 0.2 cc of #: concentrate  Dog: Vial 1 mix 0.2 cc of #: concentrate  Horse: Vial 1 mix 0.2 cc of #: concentrate  Vial 1 Additions  Antigen Total: 2.4 cc  Glycerine: 0  Dilutent: 2.6 cc  TOTAL: 5           Alon Boothe  11/19/2021  11:20 CST

## 2021-11-20 NOTE — PROGRESS NOTES
I have reviewed the notes, assessments, and/or procedures of this encounter and I concur with the documentation on Shannon Vega.      Miki Arreguin MD  11/20/21  10:04 AM CST

## 2022-01-15 DIAGNOSIS — J30.89 OTHER ALLERGIC RHINITIS: Primary | ICD-10-CM

## 2022-01-17 RX ORDER — FLUTICASONE PROPIONATE 50 MCG
SPRAY, SUSPENSION (ML) NASAL
Qty: 48 ML | Refills: 2 | Status: SHIPPED | OUTPATIENT
Start: 2022-01-17

## 2022-01-21 ENCOUNTER — CLINICAL SUPPORT NO REQUIREMENTS (OUTPATIENT)
Dept: OTOLARYNGOLOGY | Facility: CLINIC | Age: 57
End: 2022-01-21

## 2022-01-21 DIAGNOSIS — J30.89 OTHER ALLERGIC RHINITIS: Primary | ICD-10-CM

## 2022-01-21 PROCEDURE — 95165 ANTIGEN THERAPY SERVICES: CPT | Performed by: EMERGENCY MEDICINE

## 2022-01-21 NOTE — PROGRESS NOTES
Alon Boothe   Allergy Injection Mix Note    A immunotherapy injection vial was mixed using standard protocol under sterile conditions.     Mixing Nurse/ Tech: Alon Boothe ST/AT (01/21/22 1105)    Vial Series: 5    VIAL 1  Eastern Tree Mix: Vial 1 mix 0.2 cc of #: concentrate  [place] Grass: Vial 1 mix 0.2 cc of #: concentrate  Eladio Grass: Vial 1 mix 0.2 cc of #: concentrate  [place]: Vial 1 mix 0.2 cc of #: concentrate  Ragweed Mix: Vial 1 mix 0.2 cc of #: concentrate  Mold Mix: Vial 1 mix 0.2 cc of #: concentrate  Phycomycetes: Vial 1 mix 0.2 cc of #: concentrate  Candida: Vial 1 mix 0.2 cc of #: concentrate  Trichophyton: Vial 1 mix 0.2 cc of #: concentrate  Cockroach: Vial 1 mix 0.2 cc of #: concentrate  Dog: Vial 1 mix 0.2 cc of #: concentrate  Horse: Vial 1 mix 0.2 cc of #: concentrate  Vial 1 Additions  Antigen Total: 2.4 cc  Glycerine: 0  Dilutent: 2.6 cc  TOTAL: 5           Alon Boothe  1/21/2022  11:05 CST

## 2022-01-24 ENCOUNTER — TELEPHONE (OUTPATIENT)
Dept: OTOLARYNGOLOGY | Facility: CLINIC | Age: 57
End: 2022-01-24

## 2022-01-24 DIAGNOSIS — J30.81 ALLERGIC RHINITIS DUE TO ANIMAL HAIR AND DANDER: Primary | ICD-10-CM

## 2022-01-24 DIAGNOSIS — J30.9 CHRONIC ALLERGIC RHINITIS: ICD-10-CM

## 2022-01-24 RX ORDER — EPINEPHRINE 0.3 MG/.3ML
INJECTION SUBCUTANEOUS
Qty: 2 EACH | Refills: 0 | Status: SHIPPED | OUTPATIENT
Start: 2022-01-24 | End: 2023-03-28 | Stop reason: SDUPTHER

## 2022-01-24 RX ORDER — MONTELUKAST SODIUM 10 MG/1
TABLET ORAL
Qty: 90 TABLET | Refills: 3 | Status: SHIPPED | OUTPATIENT
Start: 2022-01-24 | End: 2023-02-22

## 2022-04-01 ENCOUNTER — CLINICAL SUPPORT NO REQUIREMENTS (OUTPATIENT)
Dept: OTOLARYNGOLOGY | Facility: CLINIC | Age: 57
End: 2022-04-01

## 2022-04-01 DIAGNOSIS — J30.89 OTHER ALLERGIC RHINITIS: Primary | ICD-10-CM

## 2022-04-01 PROCEDURE — 95165 ANTIGEN THERAPY SERVICES: CPT | Performed by: OTOLARYNGOLOGY

## 2022-04-01 NOTE — PROGRESS NOTES
Alon Boothe   Allergy Injection Mix Note    A immunotherapy injection vial was mixed using standard protocol under sterile conditions.     Mixing Nurse/ Tech: Alon Boothe ST/AT (04/01/22 1254)    Vial Series: 5    VIAL 1  Eastern Tree Mix: Vial 1 mix 0.2 cc of #: concentrate  [place] Grass: Vial 1 mix 0.2 cc of #: concentrate  Eladio Grass: Vial 1 mix 0.2 cc of #: concentrate  [place]: Vial 1 mix 0.2 cc of #: concentrate  Ragweed Mix: Vial 1 mix 0.2 cc of #: concentrate  Mold Mix: Vial 1 mix 0.2 cc of #: concentrate  Phycomycetes: Vial 1 mix 0.2 cc of #: concentrate  Candida: Vial 1 mix 0.2 cc of #: concentrate  Trichophyton: Vial 1 mix 0.2 cc of #: concentrate  Cockroach: Vial 1 mix 0.2 cc of #: concentrate  Dog: Vial 1 mix 0.2 cc of #: concentrate  Horse: Vial 1 mix 0.2 cc of #: concentrate  Vial 1 Additions  Antigen Total: 2.4 cc  Glycerine: 0  Dilutent: 2.6 cc  TOTAL: 5           Alon Boothe  4/1/2022  12:55 CDT

## 2022-04-05 NOTE — PROGRESS NOTES
I have reviewed the notes, assessments, and/or procedures of this encounter and I concur with the documentation on Shannon Vega.      Miki Arreguin MD  04/05/22  8:01 AM CDT

## 2022-06-24 ENCOUNTER — CLINICAL SUPPORT NO REQUIREMENTS (OUTPATIENT)
Dept: OTOLARYNGOLOGY | Facility: CLINIC | Age: 57
End: 2022-06-24

## 2022-06-24 DIAGNOSIS — J30.89 OTHER ALLERGIC RHINITIS: Primary | ICD-10-CM

## 2022-06-24 PROCEDURE — 95165 ANTIGEN THERAPY SERVICES: CPT | Performed by: OTOLARYNGOLOGY

## 2022-06-24 NOTE — PROGRESS NOTES
Alon Boothe   Allergy Injection Mix Note    A immunotherapy injection vial was mixed using standard protocol under sterile conditions.     Mixing Nurse/ Tech: Alon Boothe ST/At (06/24/22 1508)    Vial Series: 5    VIAL 1  Eastern Tree Mix: Vial 1 mix 0.2 cc of #: concentrate  [place] Grass: Vial 1 mix 0.2 cc of #: concentrate  Eladio Grass: Vial 1 mix 0.2 cc of #: concentrate  [place]: Vial 1 mix 0.2 cc of #: concentrate  Ragweed Mix: Vial 1 mix 0.2 cc of #: concentrate  Mold Mix: Vial 1 mix 0.2 cc of #: concentrate  Phycomycetes: Vial 1 mix 0.2 cc of #: concentrate  Candida: Vial 1 mix 0.2 cc of #: concentrate  Trichophyton: Vial 1 mix 0.2 cc of #: concentrate  Cockroach: Vial 1 mix 0.2 cc of #: concentrate  Dog: Vial 1 mix 0.2 cc of #: concentrate  Horse: Vial 1 mix 0.2 cc of #: concentrate  Vial 1 Additions  Antigen Total: 2.4 cc  Glycerine: 0  Dilutent: 2.6 cc  TOTAL: 5           Aoln Boothe  6/24/2022  15:09 CDT

## 2022-06-24 NOTE — PROGRESS NOTES
I have reviewed the notes, assessments, and/or procedures of this encounter and I concur with the documentation on Shannon Vega.      Miki Arreguin MD  06/24/22  3:16 PM CDT

## 2022-08-19 ENCOUNTER — CLINICAL SUPPORT NO REQUIREMENTS (OUTPATIENT)
Dept: OTOLARYNGOLOGY | Facility: CLINIC | Age: 57
End: 2022-08-19

## 2022-08-19 DIAGNOSIS — J30.89 OTHER ALLERGIC RHINITIS: Primary | ICD-10-CM

## 2022-08-19 PROCEDURE — 95165 ANTIGEN THERAPY SERVICES: CPT | Performed by: EMERGENCY MEDICINE

## 2022-08-19 NOTE — PROGRESS NOTES
Alon Boothe   Allergy Injection Mix Note    A immunotherapy injection vial was mixed using standard protocol under sterile conditions.     Mixing Nurse/ Tech: Alon Boothe ST/AT (08/19/22 1102)    Vial Series: 5    VIAL 1  Eastern Tree Mix: Vial 1 mix 0.2 cc of #: concentrate  [place] Grass: Vial 1 mix 0.2 cc of #: concentrate  Eladio Grass: Vial 1 mix 0.2 cc of #: concentrate  [place]: Vial 1 mix 0.2 cc of #: concentrate  Ragweed Mix: Vial 1 mix 0.2 cc of #: concentrate  Mold Mix: Vial 1 mix 0.2 cc of #: concentrate  Phycomycetes: Vial 1 mix 0.2 cc of #: concentrate  Candida: Vial 1 mix 0.2 cc of #: concentrate  Trichophyton: Vial 1 mix 0.2 cc of #: concentrate  Cockroach: Vial 1 mix 0.2 cc of #: concentrate  Dog: Vial 1 mix 0.2 cc of #: concentrate  Horse: Vial 1 mix 0.2 cc of #: concentrate  Vial 1 Additions  Antigen Total: 2.4 cc  Glycerine: 0  Dilutent: 2.6 cc  TOTAL: 5           Alon Boothe  8/19/2022  11:02 CDT

## 2022-11-11 ENCOUNTER — TELEPHONE (OUTPATIENT)
Dept: OTOLARYNGOLOGY | Facility: CLINIC | Age: 57
End: 2022-11-11

## 2022-11-11 NOTE — TELEPHONE ENCOUNTER
Reason for sending: EXTERNAL MED REC NOTIFICATION    Documents Description: RESULT REPORT MARIZA HENRY    Name of Sender: [employer]LD[employer]    Date Indexed: 11.11.22    Notes (if needed):

## 2022-11-14 ENCOUNTER — CLINICAL SUPPORT NO REQUIREMENTS (OUTPATIENT)
Dept: OTOLARYNGOLOGY | Facility: CLINIC | Age: 57
End: 2022-11-14

## 2022-11-14 DIAGNOSIS — J30.89 OTHER ALLERGIC RHINITIS: Primary | ICD-10-CM

## 2022-11-14 PROCEDURE — 95165 ANTIGEN THERAPY SERVICES: CPT | Performed by: OTOLARYNGOLOGY

## 2022-11-14 NOTE — PROGRESS NOTES
Alon Boothe   Allergy Injection Mix Note    A immunotherapy injection vial was mixed using standard protocol under sterile conditions.     Mixing Nurse/ Tech: Alon Boothe ST/AT (11/14/22 1137)    Vial Series: 5    VIAL 1  Eastern Tree Mix: Vial 1 mix 0.2 cc of #: concentrate  [place] Grass: Vial 1 mix 0.2 cc of #: concentrate  Eladio Grass: Vial 1 mix 0.2 cc of #: concentrate  [place]: Vial 1 mix 0.2 cc of #: concentrate  Ragweed Mix: Vial 1 mix 0.2 cc of #: concentrate  Mold Mix: Vial 1 mix 0.2 cc of #: concentrate  Phycomycetes: Vial 1 mix 0.2 cc of #: concentrate  Candida: Vial 1 mix 0.2 cc of #: concentrate  Trichophyton: Vial 1 mix 0.2 cc of #: concentrate  Cockroach: Vial 1 mix 0.2 cc of #: concentrate  Dog: Vial 1 mix 0.2 cc of #: concentrate  Horse: Vial 1 mix 0.2 cc of #: concentrate  Vial 1 Additions  Antigen Total: 2.4 cc  Glycerine: 0  Dilutent: 2.6 cc  TOTAL: 5           Alon Boothe  11/14/2022  11:37 CST

## 2022-11-15 NOTE — PROGRESS NOTES
I have reviewed the notes, assessments, and/or procedures of this encounter and I concur with the documentation on Shannon Vega.      Miki Arreguin MD  11/15/22  10:28 AM CST

## 2022-12-30 ENCOUNTER — CLINICAL SUPPORT NO REQUIREMENTS (OUTPATIENT)
Dept: OTOLARYNGOLOGY | Facility: CLINIC | Age: 57
End: 2022-12-30

## 2022-12-30 ENCOUNTER — TELEPHONE (OUTPATIENT)
Dept: OTOLARYNGOLOGY | Facility: CLINIC | Age: 57
End: 2022-12-30

## 2022-12-30 DIAGNOSIS — J30.89 OTHER ALLERGIC RHINITIS: Primary | ICD-10-CM

## 2022-12-30 PROCEDURE — 95165 ANTIGEN THERAPY SERVICES: CPT | Performed by: OTOLARYNGOLOGY

## 2022-12-30 NOTE — TELEPHONE ENCOUNTER
Spoke with the patient letting her know her serum is on its way.  Also reminded the patient she need to see  follow up after the first of the year. Patient stated she would make that appointment.

## 2022-12-30 NOTE — PROGRESS NOTES
Alon Boothe   Allergy Injection Mix Note    A immunotherapy injection vial was mixed using standard protocol under sterile conditions.     Mixing Nurse/ Tech: Alon Boothe ST/AT (12/30/22 0941)    Vial Series: 5    VIAL 1  Eastern Tree Mix: Vial 1 mix 0.2 cc of #: concentrate  [place] Grass: Vial 1 mix 0.2 cc of #: concentrate  Ealdio Grass: Vial 1 mix 0.2 cc of #: concentrate  [place] Bluegrass: Vial 1 mix 0.2 cc of #: concentrate  Ragweed Mix: Vial 1 mix 0.2 cc of #: concentrate  Mold Mix: Vial 1 mix 0.2 cc of #: concentrate  Phycomycetes: Vial 1 mix 0.2 cc of #: concentrate  Candida: Vial 1 mix 0.2 cc of #: concentrate  Trichophyton: Vial 1 mix 0.2 cc of #: concentrate  Cockroach: Vial 1 mix 0.2 cc of #: concentrate  Dog: Vial 1 mix 0.2 cc of #: concentrate  Horse: Vial 1 mix 0.2 cc of #: concentrate  Vial 1 Additions  Antigen Total: 2.4 cc  Glycerine: 0  Dilutent: 2.6 cc  TOTAL: 5           Alon Boothe  12/30/2022  09:42 CST

## 2023-02-22 DIAGNOSIS — J30.81 ALLERGIC RHINITIS DUE TO ANIMAL HAIR AND DANDER: ICD-10-CM

## 2023-02-22 RX ORDER — MONTELUKAST SODIUM 10 MG/1
TABLET ORAL
Qty: 90 TABLET | Refills: 3 | Status: SHIPPED | OUTPATIENT
Start: 2023-02-22

## 2023-03-28 ENCOUNTER — TELEMEDICINE (OUTPATIENT)
Dept: OTOLARYNGOLOGY | Facility: CLINIC | Age: 58
End: 2023-03-28
Payer: COMMERCIAL

## 2023-03-28 DIAGNOSIS — J30.9 CHRONIC ALLERGIC RHINITIS: ICD-10-CM

## 2023-03-28 PROCEDURE — 99213 OFFICE O/P EST LOW 20 MIN: CPT | Performed by: OTOLARYNGOLOGY

## 2023-03-28 RX ORDER — EPINEPHRINE 0.3 MG/.3ML
INJECTION SUBCUTANEOUS
Qty: 2 EACH | Refills: 0 | Status: SHIPPED | OUTPATIENT
Start: 2023-03-28

## 2023-03-28 NOTE — PROGRESS NOTES
Miki Arreguin MD     FOLLOW UP VIDEO VISIT   1. This service was provided via Telemedicine connected with: [employer]    2. TeleMed provider: Miki Arreguin MD   3. Provider location: [facility] E[facility] clinic  4. Additional parties in room with patient during tele consult: none  5. After connecting through audio and visual connection, the patient was identified by name and date of birth. We discussed that this was a Telemedicine visit and that the visit was being conducted confidentially over secure lines. Consent and understanding of privacy and security of the Telemedicine visit was demonstrated. I have reviewed the medical record in [employer] and presented the opportunity to ask any questions regarding the visit today. The advantages and limitations of video visits were discussed and understood. Consent was given to participate.     Chief Complaint   Patient presents with   • Allergic Rhinitis        HPI  Shannon Vega is a 58 y.o. female who presents for a video follow up visit. She has had stable symptoms. The patient has not had complaints of: neither worsening of symptoms.  She has noticed some increase in the runny nose since the springtime as started.  Overall, her symptoms have been stable with immunotherapy.  She is interested in going on to every other week injections.  She gets her injections at [facility].  She has been on Zyrtec as needed, Flonase, and montelukast.  She has had some kidney disease recently.        Physical Exam   Constitutional: She appears well-developed and well-nourished.   HENT:   Head: Normocephalic and atraumatic.   Right Ear: External ear normal.   Left Ear: External ear normal.   Nose: Nose normal.   Eyes: Pupils are equal, round, and reactive to light. Conjunctivae and EOM are normal. Right eye exhibits no discharge. Left eye exhibits no discharge.   Neck: Neck normal appearance.  Pulmonary/Chest: Effort normal.   Neurological:  She is alert.   Psychiatric: She has a normal mood and affect.        RESULTS REVIEW:    I have reviewed the patients old records in the chart.       Assessment & Plan    Assessment:   1. Chronic allergic rhinitis        Plan:      Continue current management plan.  We will wean her immunotherapy down to every other week.  If she does well, we will consider discontinuing immunotherapy in 4 months.     New Medications Ordered This Visit   Medications   • EPINEPHrine (EPIPEN) 0.3 MG/0.3ML solution auto-injector injection     Sig: USE AS DIRECTED     Dispense:  2 each     Refill:  0     DX Code Needed  .     Return in about 4 months (around 7/28/2023) for follow up video visit.        Total visit time: Approximately 15 minutes    Miki Arreguin MD  03/28/23  15:42 CDT

## 2023-04-07 ENCOUNTER — CLINICAL SUPPORT NO REQUIREMENTS (OUTPATIENT)
Dept: OTOLARYNGOLOGY | Facility: CLINIC | Age: 58
End: 2023-04-07
Payer: COMMERCIAL

## 2023-04-07 DIAGNOSIS — J30.89 OTHER ALLERGIC RHINITIS: Primary | ICD-10-CM

## 2023-04-07 PROCEDURE — 95165 ANTIGEN THERAPY SERVICES: CPT | Performed by: OTOLARYNGOLOGY

## 2023-04-07 NOTE — PROGRESS NOTES
Alon Boothe   Allergy Injection Mix Note    A immunotherapy injection vial was mixed using standard protocol under sterile conditions.     Mixing Nurse/ Tech: Alon Boothe ST/AT (04/07/23 1121)    Vial Series: 5    VIAL 1  Eastern Tree Mix: Vial 1 mix 0.2 cc of #: concentrate  [place] Grass: Vial 1 mix 0.2 cc of #: concentrate  Eladio Grass: Vial 1 mix 0.2 cc of #: concentrate  [place]: Vial 1 mix 0.2 cc of #: concentrate  Ragweed Mix: Vial 1 mix 0.2 cc of #: concentrate  Mold Mix: Vial 1 mix 0.2 cc of #: concentrate  Phycomycetes: Vial 1 mix 0.2 cc of #: concentrate  Candida: Vial 1 mix 0.2 cc of #: concentrate  Trichophyton: Vial 1 mix 0.2 cc of #: concentrate  Cockroach: Vial 1 mix 0.2 cc of #: concentrate  Dog: Vial 1 mix 0.2 cc of #: concentrate  Horse: Vial 1 mix 0.2 cc of #: concentrate  Vial 1 Additions  Antigen Total: 2.4 cc  Glycerine: 0  Dilutent: 2.6 cc  TOTAL: 5           Alon Boothe  4/7/2023  11:21 CDT

## 2023-04-10 ENCOUNTER — DOCUMENTATION (OUTPATIENT)
Dept: OTOLARYNGOLOGY | Facility: CLINIC | Age: 58
End: 2023-04-10
Payer: COMMERCIAL

## 2023-04-10 ENCOUNTER — TELEPHONE (OUTPATIENT)
Dept: OTOLARYNGOLOGY | Facility: CLINIC | Age: 58
End: 2023-04-10
Payer: COMMERCIAL

## 2023-04-10 DIAGNOSIS — J30.9 CHRONIC ALLERGIC RHINITIS: Primary | ICD-10-CM

## 2023-04-10 RX ORDER — EPINEPHRINE 0.3 MG/.3ML
INJECTION SUBCUTANEOUS
Qty: 2 EACH | Refills: 0 | Status: SHIPPED | OUTPATIENT
Start: 2023-04-10

## 2023-04-10 NOTE — PROGRESS NOTES
I have reviewed the notes, assessments, and/or procedures of this encounter and I concur with the documentation on Shannon Vega.      Miki Arreguin MD  04/10/23  8:47 AM CDT

## 2023-04-10 NOTE — TELEPHONE ENCOUNTER
Left message about serum refill being mailed out on Tuesday 4/11/2023. EpiPen refill was sent per ABDI Tyson.

## 2023-06-28 ENCOUNTER — TELEPHONE (OUTPATIENT)
Dept: OTOLARYNGOLOGY | Facility: CLINIC | Age: 58
End: 2023-06-28

## 2023-06-28 NOTE — TELEPHONE ENCOUNTER
Provider: DR HENDRICKS  Caller: JOHN HENRY  Relationship to Patient: SELF    Reason for Call: CALLING TO ADVISE RUTH PT IS OUT OF ALLERGY MED SERUM FOR BI-WEEKLY SHOTS.      [facility] [address] PCP FAXED REQUEST FOR NEW MED 6/27/23.    PT WILL BE DUE NEXT SHOT ON 7/12/23    PLS CALL PT TO CONFIRM THIS HAS BEEN RECVD AND SENT.

## 2023-08-16 ENCOUNTER — TELEPHONE (OUTPATIENT)
Dept: OTOLARYNGOLOGY | Facility: CLINIC | Age: 58
End: 2023-08-16
Payer: COMMERCIAL

## 2023-08-17 ENCOUNTER — TELEPHONE (OUTPATIENT)
Dept: OTOLARYNGOLOGY | Facility: CLINIC | Age: 58
End: 2023-08-17
Payer: COMMERCIAL

## 2023-08-17 NOTE — TELEPHONE ENCOUNTER
Called patient, she is currently doing biweekly allergy injections.  She would like to stay on this plan.  We will follow up in 6 months.

## 2023-10-06 ENCOUNTER — CLINICAL SUPPORT NO REQUIREMENTS (OUTPATIENT)
Dept: OTOLARYNGOLOGY | Facility: CLINIC | Age: 58
End: 2023-10-06
Payer: COMMERCIAL

## 2023-10-06 ENCOUNTER — TELEPHONE (OUTPATIENT)
Dept: OTOLARYNGOLOGY | Facility: CLINIC | Age: 58
End: 2023-10-06
Payer: COMMERCIAL

## 2023-10-06 DIAGNOSIS — J30.89 OTHER ALLERGIC RHINITIS: Primary | ICD-10-CM

## 2023-10-06 NOTE — TELEPHONE ENCOUNTER
Patient is aware that her allergy serum refill is mixed and will be mailed on Monday, October 9, 2023.

## 2023-10-06 NOTE — PROGRESS NOTES
Alon Boothe   Allergy Injection Mix Note    A immunotherapy injection vial was mixed using standard protocol under sterile conditions.     Mixing Nurse/ Tech: RONDA (10/06/23 1029)    Vial Series: 5    VIAL 1  Eastern Tree Mix: Vial 1 mix 0.2 cc of #: concentrate  [place]erm[place] Grass: Vial 1 mix 0.2 cc of #: concentrate  Eladio Grass: Vial 1 mix 0.2 cc of #: concentrate  [place]: Vial 1 mix 0.2 cc of #: concentrate  Ragweed Mix: Vial 1 mix 0.2 cc of #: concentrate  Mold Mix: Vial 1 mix 0.2 cc of #: concentrate  Phycomycetes: Vial 1 mix 0.2 cc of #: concentrate  Candida: Vial 1 mix 0.2 cc of #: concentrate  Trichophyton: Vial 1 mix 0.2 cc of #: concentrate  Cockroach: Vial 1 mix 0.2 cc of #: concentrate  Dog: Vial 1 mix 0.2 cc of #: concentrate  Horse: Vial 1 mix 0.2 cc of #: concentrate  Vial 1 Additions  Antigen Total: 2.4 cc  Glycerine: 0  Dilutent: 2.6 cc  TOTAL: 5           Alon Boothe  10/6/2023  10:29 CDT

## 2023-10-09 NOTE — PROGRESS NOTES
I have reviewed the notes, assessments, and/or procedures of this encounter and I concur with the documentation on Shannon Vega.      Miki Arreguin MD  10/09/23  11:49 AM CDT

## 2024-01-19 DIAGNOSIS — J30.81 ALLERGIC RHINITIS DUE TO ANIMAL HAIR AND DANDER: ICD-10-CM

## 2024-01-19 RX ORDER — MONTELUKAST SODIUM 10 MG/1
TABLET ORAL
Qty: 90 TABLET | Refills: 3 | OUTPATIENT
Start: 2024-01-19

## 2024-02-09 ENCOUNTER — CLINICAL SUPPORT NO REQUIREMENTS (OUTPATIENT)
Dept: OTOLARYNGOLOGY | Facility: CLINIC | Age: 59
End: 2024-02-09
Payer: COMMERCIAL

## 2024-02-09 DIAGNOSIS — J30.81 ALLERGIC RHINITIS DUE TO ANIMAL HAIR AND DANDER: Primary | ICD-10-CM

## 2024-02-09 NOTE — PROGRESS NOTES
Alon Boothe   Allergy Injection Mix Note    A immunotherapy injection vial was mixed using standard protocol under sterile conditions.     Mixing Nurse/ Tech: RONDA (02/09/24 4227)    Vial Series: 5    VIAL 1  Eastern Tree Mix: Vial 1 mix 0.2 cc of #: concentrate  [place] Grass: Vial 1 mix 0.2 cc of #: concentrate  Eladio Grass: Vial 1 mix 0.2 cc of #: concentrate  [place]: Vial 1 mix 0.2 cc of #: concentrate  Ragweed Mix: Vial 1 mix 0.2 cc of #: concentrate  Mold Mix: Vial 1 mix 0.2 cc of #: concentrate  Phycomycetes: Vial 1 mix 0.2 cc of #: concentrate  Candida: Vial 1 mix 0.2 cc of #: concentrate  Trichophyton: Vial 1 mix 0.2 cc of #: concentrate  Cockroach: Vial 1 mix 0.2 cc of #: concentrate  Dog: Vial 1 mix 0.2 cc of #: concentrate  Horse: Vial 1 mix 0.2 cc of #: concentrate  Vial 1 Additions  Antigen Total: 2.4 cc  Glycerine: 0  Dilutent: 2.6 cc  TOTAL: 5           Alon Boothe  2/9/2024  09:19 CST

## 2024-02-21 ENCOUNTER — TELEMEDICINE (OUTPATIENT)
Dept: OTOLARYNGOLOGY | Facility: CLINIC | Age: 59
End: 2024-02-21
Payer: COMMERCIAL

## 2024-02-21 DIAGNOSIS — J30.81 ALLERGIC RHINITIS DUE TO ANIMAL HAIR AND DANDER: Primary | ICD-10-CM

## 2024-02-21 DIAGNOSIS — J30.1 SEASONAL ALLERGIC RHINITIS DUE TO POLLEN: Chronic | ICD-10-CM

## 2024-02-21 DIAGNOSIS — J30.89 NON-SEASONAL ALLERGIC RHINITIS DUE TO FUNGAL SPORES: Chronic | ICD-10-CM

## 2024-02-21 DIAGNOSIS — J30.9 CHRONIC ALLERGIC RHINITIS: ICD-10-CM

## 2024-02-21 DIAGNOSIS — J30.89 OTHER ALLERGIC RHINITIS: ICD-10-CM

## 2024-02-21 RX ORDER — MONTELUKAST SODIUM 10 MG/1
10 TABLET ORAL DAILY
Qty: 90 TABLET | Refills: 3 | Status: SHIPPED | OUTPATIENT
Start: 2024-02-21

## 2024-02-21 RX ORDER — EPINEPHRINE 0.3 MG/.3ML
INJECTION SUBCUTANEOUS
Qty: 2 EACH | Refills: 0 | Status: SHIPPED | OUTPATIENT
Start: 2024-02-21

## 2024-02-21 RX ORDER — FLUTICASONE PROPIONATE 50 MCG
2 SPRAY, SUSPENSION (ML) NASAL DAILY
Qty: 48 ML | Refills: 2 | Status: SHIPPED | OUTPATIENT
Start: 2024-02-21

## 2024-02-21 NOTE — PROGRESS NOTES
Miki Arreguin MD     FOLLOW UP VIDEO VISIT   1. This service was provided via Telemedicine connected with : [employer].    2. TeleMed provider: Miki Arreguin MD   3. Provider location: [facility] ENT clinic  4. Additional parties in room with patient during tele consult: none  5. After connecting through audio and visual connection, the patient was identified by name and date of birth. We discussed that this was a Telemedicine visit and that the visit was being conducted confidentially over secure lines. Consent and understanding of privacy and security of the Telemedicine visit was demonstrated. I have reviewed the medical record in [employer] and presented the opportunity to ask any questions regarding the visit today. The advantages and limitations of video visits were discussed and understood. Consent was given to participate.     Chief Complaint   Patient presents with    Allergic Rhinitis        HPI  Shannon Vega is a 58 y.o. female who presents for a video follow up visit. She has had good relief of symptoms since being on immunotherapy.  She still has some congestion complaints and uses Singulair and antihistamines as needed.  She is on Flonase.  She has been on immunotherapy since 2014.         Physical Exam   Constitutional: She appears well-developed and well-nourished.   HENT:   Head: Normocephalic and atraumatic.   Right Ear: External ear normal.   Left Ear: External ear normal.   Nose: Nose normal.   Eyes: Pupils are equal, round, and reactive to light. Conjunctivae and EOM are normal. Right eye exhibits no discharge. Left eye exhibits no discharge.   Neck: Neck normal appearance.  Pulmonary/Chest: Effort normal.   Neurological: She is alert.   Psychiatric: She has a normal mood and affect.                 Assessment & Plan  Allergic rhinitis due to animal hair and dander  Continue every other week immunotherapy.  We discussed the possibility of discontinuing  her immunotherapy to see if she has longstanding relief.  She will consider this.  We also discussed the possibility of doing a Coblation turbinoplasty in the office if her congestion continues.  She will consider this as well.  Non-seasonal allergic rhinitis due to fungal spores    Seasonal allergic rhinitis due to pollen    Other allergic rhinitis    Chronic allergic rhinitis            New Medications Ordered This Visit   Medications    montelukast (SINGULAIR) 10 MG tablet     Sig: Take 1 tablet by mouth Daily.     Dispense:  90 tablet     Refill:  3    fluticasone (FLONASE) 50 MCG/ACT nasal spray     Si sprays by Each Nare route Daily.     Dispense:  48 mL     Refill:  2    EPINEPHrine (EPIPEN) 0.3 MG/0.3ML solution auto-injector injection     Sig: USE AS DIRECTED     Dispense:  2 each     Refill:  0     DX Code Needed  .          Return in about 1 year (around 2025).     Total visit time: Approximately 15 minutes    Miki Arreguin MD  24  08:29 CST

## 2024-02-21 NOTE — ASSESSMENT & PLAN NOTE
Continue every other week immunotherapy.  We discussed the possibility of discontinuing her immunotherapy to see if she has longstanding relief.  She will consider this.  We also discussed the possibility of doing a Coblation turbinoplasty in the office if her congestion continues.  She will consider this as well.

## 2024-06-28 ENCOUNTER — CLINICAL SUPPORT NO REQUIREMENTS (OUTPATIENT)
Dept: OTOLARYNGOLOGY | Facility: CLINIC | Age: 59
End: 2024-06-28
Payer: COMMERCIAL

## 2024-06-28 ENCOUNTER — TELEPHONE (OUTPATIENT)
Dept: OTOLARYNGOLOGY | Facility: CLINIC | Age: 59
End: 2024-06-28
Payer: COMMERCIAL

## 2024-06-28 DIAGNOSIS — J30.81 ALLERGIC RHINITIS DUE TO ANIMAL HAIR AND DANDER: Primary | ICD-10-CM

## 2024-06-28 NOTE — PROGRESS NOTES
Alon Boothe   Allergy Injection Mix Note    A immunotherapy injection vial was mixed using standard protocol under sterile conditions.     Mixing Nurse/ Tech: RONDA (06/28/24 0845)    Vial Series: 5    VIAL 1  Eastern Tree Mix: Vial 1 mix 0.2 cc of #: concentrate  [place]ermuda Grass: Vial 1 mix 0.2 cc of #: concentrate  Eladio Grass: Vial 1 mix 0.2 cc of #: concentrate  [place] Bluegrass: Vial 1 mix 0.2 cc of #: concentrate  Ragweed Mix: Vial 1 mix 0.2 cc of #: concentrate  Mold Mix: Vial 1 mix 0.2 cc of #: concentrate  Phycomycetes: Vial 1 mix 0.2 cc of #: concentrate  Candida: Vial 1 mix 0.2 cc of #: concentrate  Trichophyton: Vial 1 mix 0.2 cc of #: concentrate  Cockroach: Vial 1 mix 0.2 cc of #: concentrate  Dog: Vial 1 mix 0.2 cc of #: concentrate  Horse: Vial 1 mix 0.2 cc of #: concentrate  Vial 1 Additions  Antigen Total: 2.4 cc  Glycerine: 0  Dilutent: 2.6 cc  TOTAL: 5           Alon Boothe  6/28/2024  08:46 CDT

## 2024-06-28 NOTE — TELEPHONE ENCOUNTER
The patient is aware that her allergy serum refill has been mixed and will be mailed out on 7/1/2024.

## 2024-10-02 DIAGNOSIS — J30.9 CHRONIC ALLERGIC RHINITIS: ICD-10-CM

## 2024-10-02 RX ORDER — EPINEPHRINE 0.3 MG/.3ML
INJECTION SUBCUTANEOUS
Qty: 2 EACH | Refills: 0 | Status: SHIPPED | OUTPATIENT
Start: 2024-10-02

## 2024-10-21 ENCOUNTER — CLINICAL SUPPORT NO REQUIREMENTS (OUTPATIENT)
Dept: OTOLARYNGOLOGY | Facility: CLINIC | Age: 59
End: 2024-10-21
Payer: COMMERCIAL

## 2024-10-21 DIAGNOSIS — J30.81 ALLERGIC RHINITIS DUE TO ANIMAL HAIR AND DANDER: Primary | ICD-10-CM

## 2024-10-21 PROCEDURE — 95165 ANTIGEN THERAPY SERVICES: CPT | Performed by: OTOLARYNGOLOGY

## 2024-10-21 NOTE — PROGRESS NOTES
Alon Boothe   Allergy Injection Mix Note    A immunotherapy injection vial was mixed using standard protocol under sterile conditions.     Mixing Nurse/ Tech: RONDA (10/21/24 1027)    Vial Series: 5    VIAL 1  Eastern Tree Mix: Vial 1 mix 0.2 cc of #: concentrate  [place] Grass: Vial 1 mix 0.2 cc of #: concentrate  [place]io Grass: Vial 1 mix 0.2 cc of #: concentrate  [place] Bluegrass: Vial 1 mix 0.2 cc of #: concentrate  Ragweed Mix: Vial 1 mix 0.2 cc of #: concentrate  Mold Mix: Vial 1 mix 0.2 cc of #: concentrate  Phycomycetes: Vial 1 mix 0.2 cc of #: concentrate  Candida: Vial 1 mix 0.2 cc of #: concentrate  Trichophyton: Vial 1 mix 0.2 cc of #: concentrate  Cockroach: Vial 1 mix 0.2 cc of #: concentrate  Dog: Vial 1 mix 0.2 cc of #: concentrate  Horse: Vial 1 mix 0.2 cc of #: concentrate  Vial 1 Additions  Antigen Total: 2.4 cc  Glycerine: 0  Dilutent: 2.6 cc  TOTAL: 5           Alon Boothe  10/21/2024  10:27 CDT

## 2024-11-16 DIAGNOSIS — J30.89 OTHER ALLERGIC RHINITIS: ICD-10-CM

## 2024-11-18 RX ORDER — FLUTICASONE PROPIONATE 50 MCG
2 SPRAY, SUSPENSION (ML) NASAL DAILY
Qty: 48 G | Refills: 2 | Status: SHIPPED | OUTPATIENT
Start: 2024-11-18

## 2024-12-05 DIAGNOSIS — J30.81 ALLERGIC RHINITIS DUE TO ANIMAL HAIR AND DANDER: ICD-10-CM

## 2024-12-05 RX ORDER — MONTELUKAST SODIUM 10 MG/1
10 TABLET ORAL DAILY
Qty: 90 TABLET | Refills: 3 | Status: SHIPPED | OUTPATIENT
Start: 2024-12-05

## 2025-02-05 ENCOUNTER — TELEPHONE (OUTPATIENT)
Dept: OTOLARYNGOLOGY | Facility: CLINIC | Age: 60
End: 2025-02-05
Payer: COMMERCIAL

## 2025-02-05 NOTE — TELEPHONE ENCOUNTER
Addendum  created 03/25/21 1633 by Sedrick Truong MD    Child order released for a procedure order, Clinical Note Signed, Intraprocedure Blocks edited Nurse called and left message for patient that we received a fax that she is nearing the end of her allergy vial.  Nurse left a message that there may be a delay in next allergy vial as we are outsourcing to [facility].  Patient can call back to inquire more information at [phone].

## 2025-02-26 ENCOUNTER — TELEMEDICINE (OUTPATIENT)
Dept: OTOLARYNGOLOGY | Facility: CLINIC | Age: 60
End: 2025-02-26
Payer: COMMERCIAL

## 2025-02-26 VITALS — WEIGHT: 293 LBS | BODY MASS INDEX: 41.02 KG/M2 | HEIGHT: 71 IN

## 2025-02-26 DIAGNOSIS — J30.89 OTHER ALLERGIC RHINITIS: Primary | ICD-10-CM

## 2025-02-26 RX ORDER — ASPIRIN 81 MG/1
81 TABLET ORAL DAILY
COMMUNITY

## 2025-02-26 RX ORDER — LIRAGLUTIDE 6 MG/ML
1.8 INJECTION SUBCUTANEOUS DAILY
COMMUNITY

## 2025-02-26 RX ORDER — TRAZODONE HYDROCHLORIDE 50 MG/1
50 TABLET ORAL NIGHTLY PRN
COMMUNITY
Start: 2024-11-05

## 2025-02-26 RX ORDER — GABAPENTIN 100 MG/1
100 CAPSULE ORAL 3 TIMES DAILY
COMMUNITY

## 2025-02-26 RX ORDER — VENLAFAXINE HYDROCHLORIDE 150 MG/1
150 CAPSULE, EXTENDED RELEASE ORAL DAILY
COMMUNITY
Start: 2024-12-10

## 2025-02-26 RX ORDER — ARIPIPRAZOLE 15 MG/1
15 TABLET ORAL DAILY
COMMUNITY
Start: 2024-06-01

## 2025-02-26 RX ORDER — MONTELUKAST SODIUM 10 MG/1
10 TABLET ORAL DAILY
Qty: 90 TABLET | Refills: 3 | Status: SHIPPED | OUTPATIENT
Start: 2025-02-26

## 2025-02-26 RX ORDER — DAPAGLIFLOZIN 10 MG/1
10 TABLET, FILM COATED ORAL DAILY
COMMUNITY
Start: 2024-01-06

## 2025-02-26 RX ORDER — LEVOTHYROXINE SODIUM 125 UG/1
125 TABLET ORAL
COMMUNITY

## 2025-02-26 RX ORDER — EZETIMIBE 10 MG/1
10 TABLET ORAL DAILY
COMMUNITY
Start: 2024-06-24

## 2025-02-26 RX ORDER — ROPINIROLE 0.5 MG/1
0.5 TABLET, FILM COATED ORAL DAILY
COMMUNITY
Start: 2025-01-08

## 2025-02-26 RX ORDER — BUSPIRONE HYDROCHLORIDE 15 MG/1
1 TABLET ORAL 3 TIMES DAILY
COMMUNITY
Start: 2025-02-03

## 2025-02-26 RX ORDER — ERGOCALCIFEROL 1.25 MG/1
CAPSULE, LIQUID FILLED ORAL
COMMUNITY

## 2025-02-26 RX ORDER — OXYBUTYNIN CHLORIDE 15 MG/1
15 TABLET, EXTENDED RELEASE ORAL DAILY
COMMUNITY
Start: 2024-01-06

## 2025-02-26 RX ORDER — CETIRIZINE HYDROCHLORIDE 10 MG/1
10 TABLET ORAL DAILY
Qty: 30 TABLET | Refills: 2 | Status: SHIPPED | OUTPATIENT
Start: 2025-02-26 | End: 2025-05-27

## 2025-02-26 RX ORDER — FLUTICASONE PROPIONATE 50 MCG
2 SPRAY, SUSPENSION (ML) NASAL DAILY
Qty: 48 G | Refills: 2 | Status: SHIPPED | OUTPATIENT
Start: 2025-02-26

## 2025-02-26 NOTE — PROGRESS NOTES
Miki Arreguin MD  [facility]  [address], [address]  [facility] [address]  Fax [phone]  Phone [phone]      Visit Type: MYCHART VIDEO VISIT   Chief Complaint   Patient presents with    Allergic Rhinitis     1 yr f/u  Epi pen exp date 12/31/25        HISTORY OF PRESENT ILLNESS:  Shannon Vega is a  59 y.o. female who is here for follow up. She has had no current complaints. The patient has not had complaints of : nasal congestion, drainage, facial swelling, facial pain or sinusitis.  She has been on every other week immunotherapy for some time now without any issues.    Allergic Rhinitis  Presents for follow-up visit. The treatment provided significant relief. Compliance with medications is %.     Shannon Vega is a  59 y.o. female who is here for follow up. She has had no current complaints. The patient has not had complaints of : nasal congestion, drainage, facial swelling, facial pain or sinusitis.  She has been on every other week immunotherapy for some time now without any issues.    Past Medical History:   Diagnosis Date    Allergic rhinitis     Arthritis     Asthma     Chronic rhinitis     Chronic sinusitis     Depression     Deviated nasal septum     Diabetes mellitus     Eustachian tube dysfunction     GERD (gastroesophageal reflux disease)     Hypertension     Hypertrophy of nasal turbinates     Hypothyroidism     Sleep apnea     c-pap dependant       Past Surgical History:   Procedure Laterality Date    CHOLECYSTECTOMY      KNEE ARTHROSCOPY Left     KNEE SURGERY         Family History: Her family history includes Heart disease in an other family member.     Social History: She  reports that she has never smoked. She has never used smokeless tobacco. She reports that she does not drink alcohol and does not use drugs.    Home Medications:  ARIPiprazole, Accu-Chek FastClix Lancets, Coenzyme Q10,  EPINEPHrine, Liraglutide, Probiotic Product, albuterol sulfate HFA, aspirin, busPIRone, cetirizine, cyanocobalamin, dapagliflozin Propanediol, ezetimibe, fluticasone, gabapentin, guaiFENesin, levothyroxine, losartan, meclizine, montelukast, ondansetron, oxybutynin XL, rOPINIRole, spironolactone, traZODone, triamcinolone, venlafaxine XR, and vitamin D    Allergies:  She is allergic to glucosamine and shellfish-derived products.       Vital Signs:      Physical Exam   Constitutional: She appears well-developed and well-nourished. She appears obese.  HENT:   Head: Normocephalic and atraumatic.   Right Ear: External ear normal.   Left Ear: External ear normal.   Nose: Nose normal.   Eyes: Pupils are equal, round, and reactive to light. Conjunctivae and EOM are normal. Right eye exhibits no discharge. Left eye exhibits no discharge.   Neck: Neck normal appearance.  Pulmonary/Chest: Effort normal.   Neurological: She is alert.   Psychiatric: She has a normal mood and affect.          Result Review    RESULTS REVIEW    I have reviewed the patients old records in the chart.       Assessment & Plan  Other allergic rhinitis  She will finish her vial and then go off immunotherapy.  We discussed that if her symptoms return, she can go back on immunotherapy.  She will continue her medication.   Orders:    cetirizine (zyrTEC) 10 MG tablet; Take 1 tablet by mouth Daily for 90 days.    fluticasone (FLONASE) 50 MCG/ACT nasal spray; 2 sprays by Each Nare route Daily.    montelukast (SINGULAIR) 10 MG tablet; Take 1 tablet by mouth Daily.                  No follow-ups on file.  Miki Arreguin MD  02/26/25  08:30 CST

## 2025-05-25 DIAGNOSIS — J30.89 OTHER ALLERGIC RHINITIS: ICD-10-CM

## 2025-05-27 RX ORDER — CETIRIZINE HYDROCHLORIDE 10 MG/1
10 TABLET ORAL DAILY
Qty: 90 TABLET | Refills: 3 | Status: SHIPPED | OUTPATIENT
Start: 2025-05-27